# Patient Record
Sex: FEMALE | Race: BLACK OR AFRICAN AMERICAN | Employment: UNEMPLOYED | URBAN - METROPOLITAN AREA
[De-identification: names, ages, dates, MRNs, and addresses within clinical notes are randomized per-mention and may not be internally consistent; named-entity substitution may affect disease eponyms.]

---

## 2017-04-04 ENCOUNTER — OFFICE VISIT (OUTPATIENT)
Dept: GYNECOLOGY | Age: 52
End: 2017-04-04

## 2017-04-04 VITALS
WEIGHT: 206.4 LBS | BODY MASS INDEX: 33.17 KG/M2 | TEMPERATURE: 96.9 F | DIASTOLIC BLOOD PRESSURE: 70 MMHG | HEIGHT: 66 IN | RESPIRATION RATE: 17 BRPM | SYSTOLIC BLOOD PRESSURE: 127 MMHG | HEART RATE: 76 BPM

## 2017-04-04 DIAGNOSIS — D25.1 INTRAMURAL LEIOMYOMA OF UTERUS: ICD-10-CM

## 2017-04-04 DIAGNOSIS — R21 RASH, SKIN: ICD-10-CM

## 2017-04-04 DIAGNOSIS — R10.2 PELVIC PAIN IN FEMALE: ICD-10-CM

## 2017-04-04 DIAGNOSIS — Z01.419 WELL WOMAN EXAM WITH ROUTINE GYNECOLOGICAL EXAM: Primary | ICD-10-CM

## 2017-04-04 PROCEDURE — 99396 PREV VISIT EST AGE 40-64: CPT | Performed by: OBSTETRICS & GYNECOLOGY

## 2017-04-04 RX ORDER — CLOTRIMAZOLE AND BETAMETHASONE DIPROPIONATE 10; .64 MG/G; MG/G
CREAM TOPICAL 2 TIMES DAILY
Qty: 1 TUBE | Refills: 2 | Status: SHIPPED | OUTPATIENT
Start: 2017-04-04 | End: 2018-02-05 | Stop reason: SDUPTHER

## 2017-04-05 ASSESSMENT — ENCOUNTER SYMPTOMS
GASTROINTESTINAL NEGATIVE: 1
RESPIRATORY NEGATIVE: 1
EYES NEGATIVE: 1

## 2017-04-07 ENCOUNTER — HOSPITAL ENCOUNTER (OUTPATIENT)
Dept: WOMENS IMAGING | Age: 52
Discharge: HOME | End: 2017-04-07
Attending: OBSTETRICS & GYNECOLOGY | Admitting: OBSTETRICS & GYNECOLOGY

## 2017-04-07 DIAGNOSIS — Z12.31 VISIT FOR SCREENING MAMMOGRAM: ICD-10-CM

## 2017-04-07 LAB
HPV COMMENT: NORMAL
HPV TYPE 16: NOT DETECTED
HPV TYPE 18: NOT DETECTED
HPVOH (OTHER TYPES): NOT DETECTED

## 2017-04-10 ENCOUNTER — HOSPITAL ENCOUNTER (OUTPATIENT)
Dept: ULTRASOUND IMAGING | Age: 52
Discharge: HOME | End: 2017-04-10
Attending: OBSTETRICS & GYNECOLOGY | Admitting: OBSTETRICS & GYNECOLOGY

## 2017-04-10 DIAGNOSIS — R10.2 PELVIC AND PERINEAL PAIN: ICD-10-CM

## 2017-04-10 DIAGNOSIS — R10.2 PELVIC PAIN IN FEMALE: ICD-10-CM

## 2017-04-10 DIAGNOSIS — D25.1 INTRAMURAL LEIOMYOMA OF UTERUS: ICD-10-CM

## 2018-02-05 DIAGNOSIS — R21 RASH, SKIN: ICD-10-CM

## 2018-02-05 RX ORDER — CLOTRIMAZOLE AND BETAMETHASONE DIPROPIONATE 10; .64 MG/G; MG/G
CREAM TOPICAL
Qty: 30 G | Refills: 1 | Status: SHIPPED | OUTPATIENT
Start: 2018-02-05 | End: 2018-03-31

## 2018-04-09 ENCOUNTER — HOSPITAL ENCOUNTER (OUTPATIENT)
Dept: WOMENS IMAGING | Age: 53
Discharge: HOME | End: 2018-04-09
Attending: FAMILY MEDICINE | Admitting: FAMILY MEDICINE

## 2018-04-09 DIAGNOSIS — Z12.31 VISIT FOR SCREENING MAMMOGRAM: ICD-10-CM

## 2019-03-06 ENCOUNTER — OFFICE VISIT (OUTPATIENT)
Dept: GYNECOLOGY | Age: 54
End: 2019-03-06
Payer: MEDICAID

## 2019-03-06 VITALS
HEIGHT: 66 IN | SYSTOLIC BLOOD PRESSURE: 101 MMHG | BODY MASS INDEX: 31.7 KG/M2 | OXYGEN SATURATION: 99 % | DIASTOLIC BLOOD PRESSURE: 71 MMHG | RESPIRATION RATE: 17 BRPM | TEMPERATURE: 97.7 F | HEART RATE: 64 BPM | WEIGHT: 197.25 LBS

## 2019-03-06 DIAGNOSIS — Z01.419 WELL WOMAN EXAM WITH ROUTINE GYNECOLOGICAL EXAM: Primary | ICD-10-CM

## 2019-03-06 DIAGNOSIS — Z78.0 MENOPAUSE: ICD-10-CM

## 2019-03-06 DIAGNOSIS — Z12.11 ENCOUNTER FOR SCREENING COLONOSCOPY: ICD-10-CM

## 2019-03-06 PROCEDURE — 99396 PREV VISIT EST AGE 40-64: CPT | Performed by: OBSTETRICS & GYNECOLOGY

## 2019-03-06 PROCEDURE — G8484 FLU IMMUNIZE NO ADMIN: HCPCS | Performed by: OBSTETRICS & GYNECOLOGY

## 2019-03-06 ASSESSMENT — ENCOUNTER SYMPTOMS
RESPIRATORY NEGATIVE: 1
EYES NEGATIVE: 1
GASTROINTESTINAL NEGATIVE: 1

## 2019-03-14 RX ORDER — IBUPROFEN 200 MG
200 TABLET ORAL EVERY 6 HOURS PRN
COMMUNITY
End: 2019-12-22

## 2019-03-18 ENCOUNTER — ANESTHESIA EVENT (OUTPATIENT)
Dept: ENDOSCOPY | Age: 54
End: 2019-03-18
Payer: MEDICAID

## 2019-03-19 ENCOUNTER — ANESTHESIA (OUTPATIENT)
Dept: ENDOSCOPY | Age: 54
End: 2019-03-19
Payer: MEDICAID

## 2019-03-19 ENCOUNTER — HOSPITAL ENCOUNTER (OUTPATIENT)
Age: 54
Setting detail: OUTPATIENT SURGERY
Discharge: HOME | End: 2019-03-19
Attending: INTERNAL MEDICINE | Admitting: INTERNAL MEDICINE
Payer: MEDICAID

## 2019-03-19 VITALS
SYSTOLIC BLOOD PRESSURE: 101 MMHG | DIASTOLIC BLOOD PRESSURE: 62 MMHG | OXYGEN SATURATION: 100 % | RESPIRATION RATE: 18 BRPM | TEMPERATURE: 98.6 F

## 2019-03-19 VITALS
BODY MASS INDEX: 32.22 KG/M2 | RESPIRATION RATE: 16 BRPM | HEART RATE: 67 BPM | TEMPERATURE: 97 F | HEIGHT: 66 IN | WEIGHT: 200.51 LBS | SYSTOLIC BLOOD PRESSURE: 122 MMHG | DIASTOLIC BLOOD PRESSURE: 73 MMHG | OXYGEN SATURATION: 100 %

## 2019-03-19 DIAGNOSIS — Z12.11 COLON CANCER SCREENING: ICD-10-CM

## 2019-03-19 PROCEDURE — 2500000003 HC RX 250 WO HCPCS: Performed by: NURSE ANESTHETIST, CERTIFIED REGISTERED

## 2019-03-19 PROCEDURE — 3700000001 HC ADD 15 MINUTES (ANESTHESIA): Performed by: INTERNAL MEDICINE

## 2019-03-19 PROCEDURE — 7100000000 HC PACU RECOVERY - FIRST 15 MIN: Performed by: INTERNAL MEDICINE

## 2019-03-19 PROCEDURE — 2709999900 HC NON-CHARGEABLE SUPPLY: Performed by: INTERNAL MEDICINE

## 2019-03-19 PROCEDURE — 3609010600 HC COLONOSCOPY POLYPECTOMY SNARE/COLD BIOPSY: Performed by: INTERNAL MEDICINE

## 2019-03-19 PROCEDURE — 88305 TISSUE EXAM BY PATHOLOGIST: CPT

## 2019-03-19 PROCEDURE — 3609010300 HC COLONOSCOPY W/BIOPSY SINGLE/MULTIPLE: Performed by: INTERNAL MEDICINE

## 2019-03-19 PROCEDURE — 2580000003 HC RX 258: Performed by: ANESTHESIOLOGY

## 2019-03-19 PROCEDURE — 7100000011 HC PHASE II RECOVERY - ADDTL 15 MIN: Performed by: INTERNAL MEDICINE

## 2019-03-19 PROCEDURE — 7100000001 HC PACU RECOVERY - ADDTL 15 MIN: Performed by: INTERNAL MEDICINE

## 2019-03-19 PROCEDURE — 6360000002 HC RX W HCPCS: Performed by: NURSE ANESTHETIST, CERTIFIED REGISTERED

## 2019-03-19 PROCEDURE — 7100000010 HC PHASE II RECOVERY - FIRST 15 MIN: Performed by: INTERNAL MEDICINE

## 2019-03-19 PROCEDURE — 3700000000 HC ANESTHESIA ATTENDED CARE: Performed by: INTERNAL MEDICINE

## 2019-03-19 RX ORDER — PROPOFOL 10 MG/ML
INJECTION, EMULSION INTRAVENOUS PRN
Status: DISCONTINUED | OUTPATIENT
Start: 2019-03-19 | End: 2019-03-19 | Stop reason: SDUPTHER

## 2019-03-19 RX ORDER — PROPOFOL 10 MG/ML
INJECTION, EMULSION INTRAVENOUS CONTINUOUS PRN
Status: DISCONTINUED | OUTPATIENT
Start: 2019-03-19 | End: 2019-03-19 | Stop reason: SDUPTHER

## 2019-03-19 RX ORDER — SODIUM CHLORIDE 0.9 % (FLUSH) 0.9 %
10 SYRINGE (ML) INJECTION EVERY 12 HOURS SCHEDULED
Status: DISCONTINUED | OUTPATIENT
Start: 2019-03-19 | End: 2019-03-19 | Stop reason: HOSPADM

## 2019-03-19 RX ORDER — SODIUM CHLORIDE 9 MG/ML
INJECTION, SOLUTION INTRAVENOUS CONTINUOUS
Status: DISCONTINUED | OUTPATIENT
Start: 2019-03-19 | End: 2019-03-19 | Stop reason: HOSPADM

## 2019-03-19 RX ORDER — SODIUM CHLORIDE 0.9 % (FLUSH) 0.9 %
10 SYRINGE (ML) INJECTION PRN
Status: DISCONTINUED | OUTPATIENT
Start: 2019-03-19 | End: 2019-03-19 | Stop reason: HOSPADM

## 2019-03-19 RX ORDER — LIDOCAINE HYDROCHLORIDE 20 MG/ML
INJECTION, SOLUTION INFILTRATION; PERINEURAL PRN
Status: DISCONTINUED | OUTPATIENT
Start: 2019-03-19 | End: 2019-03-19 | Stop reason: SDUPTHER

## 2019-03-19 RX ADMIN — PROPOFOL 160 MCG/KG/MIN: 10 INJECTION, EMULSION INTRAVENOUS at 10:25

## 2019-03-19 RX ADMIN — SODIUM CHLORIDE: 9 INJECTION, SOLUTION INTRAVENOUS at 09:41

## 2019-03-19 RX ADMIN — LIDOCAINE HYDROCHLORIDE 100 MG: 20 INJECTION, SOLUTION INFILTRATION; PERINEURAL at 10:23

## 2019-03-19 RX ADMIN — PROPOFOL 100 MG: 10 INJECTION, EMULSION INTRAVENOUS at 10:25

## 2019-03-19 ASSESSMENT — PULMONARY FUNCTION TESTS
PIF_VALUE: 0

## 2019-03-19 ASSESSMENT — PAIN SCALES - GENERAL
PAINLEVEL_OUTOF10: 0

## 2019-03-19 ASSESSMENT — PAIN DESCRIPTION - DESCRIPTORS: DESCRIPTORS: ACHING

## 2019-03-19 ASSESSMENT — PAIN - FUNCTIONAL ASSESSMENT
PAIN_FUNCTIONAL_ASSESSMENT: ACTIVITIES ARE NOT PREVENTED
PAIN_FUNCTIONAL_ASSESSMENT: 0-10

## 2019-04-15 ENCOUNTER — INITIAL CONSULT (OUTPATIENT)
Dept: SURGERY | Age: 54
End: 2019-04-15
Payer: MEDICAID

## 2019-04-15 VITALS
WEIGHT: 200 LBS | BODY MASS INDEX: 33.32 KG/M2 | HEART RATE: 62 BPM | HEIGHT: 65 IN | SYSTOLIC BLOOD PRESSURE: 122 MMHG | DIASTOLIC BLOOD PRESSURE: 84 MMHG

## 2019-04-15 DIAGNOSIS — K62.1 RECTAL POLYP: Primary | ICD-10-CM

## 2019-04-15 PROCEDURE — 3017F COLORECTAL CA SCREEN DOC REV: CPT | Performed by: SURGERY

## 2019-04-15 PROCEDURE — G8427 DOCREV CUR MEDS BY ELIG CLIN: HCPCS | Performed by: SURGERY

## 2019-04-15 PROCEDURE — 99243 OFF/OP CNSLTJ NEW/EST LOW 30: CPT | Performed by: SURGERY

## 2019-04-15 PROCEDURE — G8417 CALC BMI ABV UP PARAM F/U: HCPCS | Performed by: SURGERY

## 2019-04-15 NOTE — PROGRESS NOTES
New Patient [facility]   Fariha Dobson MD    [address], [address]  [place] Dsouza  Phone: [phone]  Fax: [phone]    Ted Maharaj   YOB: 1965    Date of Visit:  4/15/2019    Blane Nickerson MD    HPI:     Rectal polyp:  Ted Maharaj is a 62 y.o. female seen at request of Dr. Myron Green. Patient presents for evaluation of a rectal mass found on recent colonoscopy. Her colonoscopy was for routine screening, although she states that she has had hemorrhoids with intermittent bleeding/black stool. She had bowel movements every day and does not take any stool softeners or laxatives on a regular basis. Allergies   Allergen Reactions    Aspirin Nausea Only     Outpatient Medications Marked as Taking for the 4/15/19 encounter (Initial consult) with Salima Arellano MD   Medication Sig Dispense Refill    ibuprofen (ADVIL;MOTRIN) 200 MG tablet Take 200 mg by mouth every 6 hours as needed for Pain      simvastatin (ZOCOR) 40 MG tablet Take 40 mg by mouth nightly.       triamterene-hydrochlorothiazide (MAXZIDE-25) 37.5-25 MG per tablet Take 1 tablet by mouth nightly          Past Medical History:   Diagnosis Date    Abnormal Pap smear     ASCUS (atypical squamous cells of undetermined significance) on gynecologic Papanicolaou smear complicating pregnancy, antepartum 5/2015    Endometrial thickening on ultra sound 10/2015    HIGH CHOLESTEROL     Hypertension     Migraines     Moderate dysplasia of cervix 8/2013     Past Surgical History:   Procedure Laterality Date    BREAST SURGERY  2003    left     COLONOSCOPY N/A 3/19/2019    COLONOSCOPY POLYPECTOMY SNARE/COLD BIOPSY performed by Gary Ni MD at [address] [facility] 3/19/2019    COLONOSCOPY WITH BIOPSY performed by Gary Ni MD at [address] [facility]  5/5/2013    ENDOMETRIAL BIOPSY      WISDOM TOOTH EXTRACTION       Family History   Problem Relation Age of Onset    Rheum Arthritis Neg Hx     Osteoarthritis Neg Hx     Asthma Neg Hx     Breast Cancer Neg Hx     Cancer Neg Hx     Diabetes Neg Hx     Heart Failure Neg Hx     High Cholesterol Neg Hx     Hypertension Neg Hx     Migraines Neg Hx     Ovarian Cancer Neg Hx     Rashes/Skin Problems Neg Hx     Seizures Neg Hx     Stroke Neg Hx     Thyroid Disease Neg Hx      Social History     Socioeconomic History    Marital status:      Spouse name: Not on file    Number of children: Not on file    Years of education: Not on file    Highest education level: Not on file   Occupational History    Not on file   Social Needs    Financial resource strain: Not on file    Food insecurity:     Worry: Not on file     Inability: Not on file    Transportation needs:     Medical: Not on file     Non-medical: Not on file   Tobacco Use    Smoking status: Never Smoker    Smokeless tobacco: Never Used   Substance and Sexual Activity    Alcohol use: No     Alcohol/week: 0.0 oz    Drug use: No    Sexual activity: Yes     Partners: Male   Lifestyle    Physical activity:     Days per week: Not on file     Minutes per session: Not on file    Stress: Not on file   Relationships    Social connections:     Talks on phone: Not on file     Gets together: Not on file     Attends Church service: Not on file     Active member of club or organization: Not on file     Attends meetings of clubs or organizations: Not on file     Relationship status: Not on file    Intimate partner violence:     Fear of current or ex partner: Not on file     Emotionally abused: Not on file     Physically abused: Not on file     Forced sexual activity: Not on file   Other Topics Concern    Not on file   Social History Narrative    Not on file          Vitals:    04/15/19 1007   BP: 122/84   Site: Left Upper Arm   Position: Sitting   Cuff Size: Large Adult   Pulse: 62   Weight: 200 lb (90.7 kg)   Height: 5' 5\" (1.651 m)     Body mass index is 33.28 kg/m². Wt Readings from Last 3 Encounters:   04/15/19 200 lb (90.7 kg)   03/19/19 200 lb 8.1 oz (90.9 kg)   03/06/19 197 lb 4 oz (89.5 kg)     BP Readings from Last 3 Encounters:   04/15/19 122/84   03/19/19 101/62   03/19/19 122/73          REVIEW OF SYSTEMS:   Pertinent positives are in HPI, otherwise all systems reviewed and negative    PHYSICAL EXAM:    CONSTITUTIONAL:  awake, alert, no apparent distress and mildly obese  ENT:  normocepalic, without obvious abnormality  NECK:  supple, symmetrical, trachea midline   LUNGS:  Resp easy and unlabored  CARDIOVASCULAR:  regular rate and rhythm  ABDOMEN:  Soft, NT, ND  MUSCULOSKELETAL: No edema  NEUROLOGIC:  Mental Status Exam:  Level of Alertness:   awake  Orientation:  person, place, time  Rectal Exam: multiple small external hemorrhoids, no rectal masses appreciated, no blood        DATA:  No results for input(s): WBC, HGB, HCT, PLT, NA, K, CL, CO2, BUN, CREATININE, MG, PHOS, CALCIUM, PTT, INR, AST, ALT, BILITOT, BILIDIR, NITRU, COLORU, BACTERIA in the last 72 hours.     Invalid input(s): PT, WBCU, RBCU, LEUKOCYTESUA  CBC with Differential:    Lab Results   Component Value Date    WBC 6.7 03/31/2018    RBC 4.62 03/31/2018    HGB 13.0 03/31/2018    HCT 39.4 03/31/2018     03/31/2018    MCV 85.2 03/31/2018    MCH 28.0 03/31/2018    MCHC 32.9 03/31/2018    RDW 13.9 03/31/2018    SEGSPCT 52.1 01/26/2012    LYMPHOPCT 32.0 03/31/2018    MONOPCT 9.0 03/31/2018    EOSPCT 0.9 01/26/2012    BASOPCT 1.2 03/31/2018    MONOSABS 0.6 03/31/2018    LYMPHSABS 2.2 03/31/2018    EOSABS 0.2 03/31/2018    BASOSABS 0.1 03/31/2018    DIFFTYPE Auto-K 01/26/2012     BMP:    Lab Results   Component Value Date     03/31/2018    K 3.8 03/31/2018     03/31/2018    CO2 28 03/31/2018    BUN 12 03/31/2018    LABALBU 4.0 03/31/2018    CREATININE 0.6 03/31/2018    CALCIUM 9.7 03/31/2018    GFRAA >60 03/31/2018    GFRAA >60 04/23/2012    LABGLOM >60 03/31/2018    GLUCOSE 111 03/31/2018       Pathology: FINAL DIAGNOSIS:       A. Rectum, biopsy:    -  Polypoid colorectal mucosa with intramucosal lymphoid follicles and  focal surface hyperplastic changes.    -  Negative for dysplasia or malignancy.       B. Rectum, biopsy:    -  Colorectal mucosa with features of mucosal prolapse associated with  surface erosion.    -  Negative for dysplasia or malignancy.  MUTGE/MUTGE    ASSESSMENT:     Diagnosis Orders   1. Rectal polyp           PLAN:    Yfn Powell presented for a rectal polyp near the dentate line seen on colonoscopy. Difficult to appreciate the polyp on exam.  Will proceed with rectal exam under anesthesia with rectal polypectomy. The risks, benefits, and alternatives were discussed with the patient and she is willing to consent for the procedure. I will update her H&P on the day of surgery.     Electronically signed by Daysi Boswell MD on 4/15/2019 at 11:08 AM

## 2019-04-15 NOTE — Clinical Note
Oscar Huff,I just saw Ms. fYn Powell in the office for her rectal polyp seen on colonoscopy. I'm going to perform a rectal exam under anesthesia with polypectomy later this week. Thank you for allowing me to take care of Ms. Nancy Barreto with you. Faviola Hurd

## 2019-04-15 NOTE — LETTER
[facility] Vascular Surgery  [address] [address]  Phone: [phone]  Fax: [phone]      April 15, 2019    David Jay  [address] [place] [address] [address] [address]      Dear Herbert Villagomez: The following arrangements have been completed for your procedure:  PLEASE FOLLOW THE BELOW DIRECTIONS CAREFULLY:                               Hospital: [facility]                                            [address]. [facility] [address]                             Date of Surgery:  04-                             Time of Surgery:  12:00 PM                                Arrival Time at Hospital:  10:30 AM    1. You are to have NOTHING TO EAT OR DRINK after midnight the night before your procedure. You may take any Heart or Blood Pressure medications with a sip of water to wash them down. Please refrain from any Alcohol or Caffeine or caffeine products for 24 hours prior to surgery. 2.  You will need someone to drive you home following your procedure, and to stay with you for the remainder of the day. 3.  If you develop a fever, please notify our office as soon as possible. Please keep in mind that emergencies requiring immediate intervention do occur, which creates delays. The staff will do everything possible to ensure your procedure remains on time. Please remember to bring a Photo ID & Insurance Card with you. Please  report in at the Surgery Desk down the right-hand hallway on the first floor. If the hospital requires any further information/testing, they will contact you. If you have any questions or concerns, please don't hesitate to call.     Sincerely,      Jaquan Perrin - Patient Services Specialist for:  Geovany Arndt MD

## 2019-04-15 NOTE — LETTER
CONSENT TO OPERATION  AND/OR OTHER PROCEDURE(S)          PATIENT : Miguelina Madrigal   YOB: 1965    DATE : 4/15/19          1. I request and consent that Dr. Tr Salmeron,  and/or his associates or assistants perform an operation and/or procedures on the above patient at  [facility] [place], to treat the condition(s) which appear indicated by the diagnostic studies already performed. I have been told that in general terms the nature, purpose and reasonable expectations of the operation and/or procedure(s) are:     Colorectal Exam under anesthesia with rectal polypectomy       2. It has been explained to me by the informing physician that during the course of the operation and/or procedure(s) unforeseen conditions may be revealed that necessitate an extension of the original operation and/or procedure(s) or different operation and/or procedures than those set forth in Paragraph 1. I therefore authorize and request that my physician and/or his associates or assistants perform such operations and/or procedures as are necessary and desirable in the exercise of professional judgment. The authority granted under this Paragraph 2 shall extend to all conditions that require treatment and are known to my physician at the time the operation is commenced. 3. I have been made aware by the informing physician of certain risks and consequences that are associated with the operation and/or procedure(s) described in Paragraph 1, the reasonable alternative methods or treatment, the possible consequences, the possibility that the operation and/or procedure(s) may be unsuccessful and the possibility of complications. I understand the reasonably known risks to be:  ? Bleeding  ? Infection  ? Poor Healing  ? Possible Damage to Nerve, Vessel, Tendon/Muscle or Bone  ? Need for further Treatment/Surgery  ? Stiffness  ? Pain  ? Residual or Recurrent Symptoms  ? Anesthetic and/or Medical Risks  ? 4. I have also been informed by the informing physician that there are other risks from both known and unknown causes that are attendant to the performance of any surgical procedure. I am aware that the practice of medicine and surgery is not an exact science, and that no guarantees have been made to me concerning the results of the operation and/or procedure(s). 5. I   CONSENT / REFUSE CONSENT  (strike the phrase that does not apply) to the taking of photographs before, during and/or after the operation or procedure for scientific/educational purposes. 6. I consent to the administration of anesthesia and to the use of such anesthetics as may be deemed advisable by the anesthesiologist who has been engaged by me or my physician. 7. I certify that I have read and understand the above consent to operation and/or other procedure(s); that the explanations therein referred to were made to me by the informing physician in advance of my signing this consent; that all blanks or statements requiring insertion or completion were filled in and inapplicable paragraphs, if any, were stricken before I signed; and that all questions asked by me about the operation and/or procedure(s) which I have consented to have been fully answered in a satisfactory manner.             _______________________  Witness        Signature Of Patient          _______________________  Informing Physician         Signature of Informing Physician           If patient is unable to sign or is a minor, complete one of the following:    (A)  Patient is a minor  years of age.     (B)  Patient is unable to sign because:

## 2019-04-15 NOTE — LETTER
Surgery Scheduling Form:      DEMOGRAPHICS:                                                                                                         .    Patient Name:  Giselle Aguayo  Patient :  1965   Patient SS#:      Patient Phone:  [phone] (home) [phone] (work) Alt. Patient Phone:                     Patient Address:  [address] [id number]    PCP:  Pham Nash MD  Insurance:  Payor: [employer] / Plan: [employer]  / Product Type: *No Product type* /   Insurance ID Number:    Payor/Plan Subscr  Sex Relation Sub. Ins. ID Effective Group Num   1. [id number] Female  [id number] 1/1/15 [place]                                   [address]     DIAGNOSIS & PROCEDURE:                                                                                       .    Diagnosis:   K62.1 -  Rectal Polyp  Operation:  Colorectal Exam Under Anesthesia with Rectal Polypectomy  Location:  [facility]  Surgeon:  ELY Mariscal Or:                                                                                    .    Surgeon's Scheduling Instruction:  Elective  Requested Date: 2019   OR Time: 12:00      Patient Arrival Time: 10:30OR Time Required:  60  Minutes  Anesthesia:  GENERAL  Equipment:                                                            SA Required:  Yes    Status:  Outpatient          Standard C-Arm:  No  PAT Required: Yes                             Best Time to Call: Any  Pt. Requested to see PCP for Pre-op H & P:  No  Special Comments:     INTERPRETOR RAIZA SLOAN?                        Governor Doe MD    88   PRE-CERTIFICATION INFORMATION:                                                                           .    Procedure:       CPT Code Modifier          [id number]

## 2019-04-16 ENCOUNTER — HOSPITAL ENCOUNTER (OUTPATIENT)
Dept: WOMENS IMAGING | Age: 54
Discharge: HOME | End: 2019-04-16
Payer: MEDICAID

## 2019-04-16 DIAGNOSIS — Z01.419 WELL WOMAN EXAM WITH ROUTINE GYNECOLOGICAL EXAM: ICD-10-CM

## 2019-04-16 PROCEDURE — 77067 SCR MAMMO BI INCL CAD: CPT

## 2019-04-16 NOTE — PROGRESS NOTES
C-Difficile admission screening and protocol:     * Admitted with diarrhea?no     *Prior history of C-Diff. In last 3 months?no     *Antibiotic use in the past 6-8 weeks?no     *Prior hospitalization or nursing home in the last month?  no        [address] time__1030 per daughter__________        Surgery time___1200_________    Take the following medications with a sip of water:n/a    Do not eat or drink anything after 12:00 midnight prior to your surgery. This includes water chewing gum, mints and ice chips. You may brush your teeth and gargle the morning of your surgery, but do not swallow the water     Please see your family doctor/pediatrician for a history and physical and/or concerning medications. Bring any test results/reports from your physicians office. If you are under the care of a heart doctor or specialist doctor, please be aware that you may be asked to them for clearance    You may be asked to stop blood thinners such as Coumadin, Plavix, Fragmin, Lovenox, etc., or any anti-inflammatories such as:  Aspirin, Ibuprofen, Advil, Naproxen prior to your surgery. We also ask that you stop any OTC medications such as fish oil, vitamin E, glucosamine, garlic, Multivitamins, COQ 10, etc.    We ask that you do not smoke 24 hours prior to surgery  We ask that you do not  drink any alcoholic beverages 24 hours prior to surgery     You must make arrangements for a responsible adult to take you home after your surgery. For your safety you will not be allowed to leave alone or drive yourself home. Your surgery will be cancelled if you do not have a ride home. Also for your safety, it is strongly suggested that someone stay with you the first 24 hours after your surgery. A parent or legal guardian must accompany a child scheduled for surgery and plan to stay at the hospital until the child is discharged.     Please do not bring other children with [phone]  Please note these are generalized instructions for all surgical cases, you may be provided with more specific instructions according to your surgery.

## 2019-04-17 ENCOUNTER — ANESTHESIA EVENT (OUTPATIENT)
Dept: OPERATING ROOM | Age: 54
End: 2019-04-17
Payer: MEDICAID

## 2019-04-18 ENCOUNTER — HOSPITAL ENCOUNTER (OUTPATIENT)
Age: 54
Setting detail: OUTPATIENT SURGERY
Discharge: HOME | End: 2019-04-18
Attending: SURGERY | Admitting: SURGERY
Payer: MEDICAID

## 2019-04-18 ENCOUNTER — ANESTHESIA (OUTPATIENT)
Dept: OPERATING ROOM | Age: 54
End: 2019-04-18
Payer: MEDICAID

## 2019-04-18 VITALS
HEIGHT: 65 IN | OXYGEN SATURATION: 95 % | BODY MASS INDEX: 32.87 KG/M2 | WEIGHT: 197.31 LBS | SYSTOLIC BLOOD PRESSURE: 157 MMHG | RESPIRATION RATE: 18 BRPM | DIASTOLIC BLOOD PRESSURE: 94 MMHG | HEART RATE: 63 BPM | TEMPERATURE: 96.9 F

## 2019-04-18 VITALS
RESPIRATION RATE: 1 BRPM | DIASTOLIC BLOOD PRESSURE: 70 MMHG | TEMPERATURE: 96.8 F | OXYGEN SATURATION: 100 % | SYSTOLIC BLOOD PRESSURE: 126 MMHG

## 2019-04-18 DIAGNOSIS — K64.8 INTERNAL AND EXTERNAL HEMORRHOIDS WITHOUT COMPLICATION: Primary | ICD-10-CM

## 2019-04-18 DIAGNOSIS — K62.1 RECTAL POLYP: ICD-10-CM

## 2019-04-18 DIAGNOSIS — K64.4 INTERNAL AND EXTERNAL HEMORRHOIDS WITHOUT COMPLICATION: Primary | ICD-10-CM

## 2019-04-18 PROCEDURE — 2500000003 HC RX 250 WO HCPCS: Performed by: SURGERY

## 2019-04-18 PROCEDURE — 2580000003 HC RX 258: Performed by: ANESTHESIOLOGY

## 2019-04-18 PROCEDURE — 2580000003 HC RX 258: Performed by: SURGERY

## 2019-04-18 PROCEDURE — C9290 INJ, BUPIVACAINE LIPOSOME: HCPCS | Performed by: SURGERY

## 2019-04-18 PROCEDURE — 2500000003 HC RX 250 WO HCPCS: Performed by: NURSE ANESTHETIST, CERTIFIED REGISTERED

## 2019-04-18 PROCEDURE — 7100000000 HC PACU RECOVERY - FIRST 15 MIN: Performed by: SURGERY

## 2019-04-18 PROCEDURE — 7100000001 HC PACU RECOVERY - ADDTL 15 MIN: Performed by: SURGERY

## 2019-04-18 PROCEDURE — 7100000011 HC PHASE II RECOVERY - ADDTL 15 MIN: Performed by: SURGERY

## 2019-04-18 PROCEDURE — 6360000002 HC RX W HCPCS: Performed by: SURGERY

## 2019-04-18 PROCEDURE — 6360000002 HC RX W HCPCS: Performed by: ANESTHESIOLOGY

## 2019-04-18 PROCEDURE — 6370000000 HC RX 637 (ALT 250 FOR IP): Performed by: ANESTHESIOLOGY

## 2019-04-18 PROCEDURE — 2720000010 HC SURG SUPPLY STERILE: Performed by: SURGERY

## 2019-04-18 PROCEDURE — 46255 REMOVE INT/EXT HEM 1 GROUP: CPT | Performed by: SURGERY

## 2019-04-18 PROCEDURE — 6360000002 HC RX W HCPCS: Performed by: NURSE ANESTHETIST, CERTIFIED REGISTERED

## 2019-04-18 PROCEDURE — 3600000013 HC SURGERY LEVEL 3 ADDTL 15MIN: Performed by: SURGERY

## 2019-04-18 PROCEDURE — 2709999900 HC NON-CHARGEABLE SUPPLY: Performed by: SURGERY

## 2019-04-18 PROCEDURE — 3700000000 HC ANESTHESIA ATTENDED CARE: Performed by: SURGERY

## 2019-04-18 PROCEDURE — 6370000000 HC RX 637 (ALT 250 FOR IP): Performed by: SURGERY

## 2019-04-18 PROCEDURE — 88304 TISSUE EXAM BY PATHOLOGIST: CPT

## 2019-04-18 PROCEDURE — 7100000010 HC PHASE II RECOVERY - FIRST 15 MIN: Performed by: SURGERY

## 2019-04-18 PROCEDURE — 3700000001 HC ADD 15 MINUTES (ANESTHESIA): Performed by: SURGERY

## 2019-04-18 PROCEDURE — 3600000003 HC SURGERY LEVEL 3 BASE: Performed by: SURGERY

## 2019-04-18 RX ORDER — FENTANYL CITRATE 50 UG/ML
25 INJECTION, SOLUTION INTRAMUSCULAR; INTRAVENOUS EVERY 5 MIN PRN
Status: DISCONTINUED | OUTPATIENT
Start: 2019-04-18 | End: 2019-04-18 | Stop reason: HOSPADM

## 2019-04-18 RX ORDER — MEPERIDINE HYDROCHLORIDE 25 MG/ML
12.5 INJECTION INTRAMUSCULAR; INTRAVENOUS; SUBCUTANEOUS EVERY 5 MIN PRN
Status: DISCONTINUED | OUTPATIENT
Start: 2019-04-18 | End: 2019-04-18 | Stop reason: HOSPADM

## 2019-04-18 RX ORDER — MORPHINE SULFATE 2 MG/ML
1 INJECTION, SOLUTION INTRAMUSCULAR; INTRAVENOUS EVERY 5 MIN PRN
Status: DISCONTINUED | OUTPATIENT
Start: 2019-04-18 | End: 2019-04-18 | Stop reason: HOSPADM

## 2019-04-18 RX ORDER — NEOSTIGMINE METHYLSULFATE 1 MG/ML
INJECTION, SOLUTION INTRAVENOUS PRN
Status: DISCONTINUED | OUTPATIENT
Start: 2019-04-18 | End: 2019-04-18 | Stop reason: SDUPTHER

## 2019-04-18 RX ORDER — GLYCOPYRROLATE 0.2 MG/ML
INJECTION INTRAMUSCULAR; INTRAVENOUS PRN
Status: DISCONTINUED | OUTPATIENT
Start: 2019-04-18 | End: 2019-04-18 | Stop reason: SDUPTHER

## 2019-04-18 RX ORDER — ONDANSETRON 2 MG/ML
4 INJECTION INTRAMUSCULAR; INTRAVENOUS
Status: DISCONTINUED | OUTPATIENT
Start: 2019-04-18 | End: 2019-04-18 | Stop reason: HOSPADM

## 2019-04-18 RX ORDER — KETOROLAC TROMETHAMINE 30 MG/ML
INJECTION, SOLUTION INTRAMUSCULAR; INTRAVENOUS PRN
Status: DISCONTINUED | OUTPATIENT
Start: 2019-04-18 | End: 2019-04-18 | Stop reason: SDUPTHER

## 2019-04-18 RX ORDER — SODIUM CHLORIDE 0.9 % (FLUSH) 0.9 %
10 SYRINGE (ML) INJECTION PRN
Status: DISCONTINUED | OUTPATIENT
Start: 2019-04-18 | End: 2019-04-18 | Stop reason: HOSPADM

## 2019-04-18 RX ORDER — ROCURONIUM BROMIDE 10 MG/ML
INJECTION, SOLUTION INTRAVENOUS PRN
Status: DISCONTINUED | OUTPATIENT
Start: 2019-04-18 | End: 2019-04-18 | Stop reason: SDUPTHER

## 2019-04-18 RX ORDER — OXYCODONE HYDROCHLORIDE 5 MG/1
10 TABLET ORAL PRN
Status: COMPLETED | OUTPATIENT
Start: 2019-04-18 | End: 2019-04-18

## 2019-04-18 RX ORDER — DEXAMETHASONE SODIUM PHOSPHATE 4 MG/ML
INJECTION, SOLUTION INTRA-ARTICULAR; INTRALESIONAL; INTRAMUSCULAR; INTRAVENOUS; SOFT TISSUE PRN
Status: DISCONTINUED | OUTPATIENT
Start: 2019-04-18 | End: 2019-04-18 | Stop reason: SDUPTHER

## 2019-04-18 RX ORDER — MAGNESIUM HYDROXIDE 1200 MG/15ML
LIQUID ORAL CONTINUOUS PRN
Status: COMPLETED | OUTPATIENT
Start: 2019-04-18 | End: 2019-04-18

## 2019-04-18 RX ORDER — OXYCODONE HYDROCHLORIDE AND ACETAMINOPHEN 5; 325 MG/1; MG/1
1-2 TABLET ORAL EVERY 6 HOURS PRN
Qty: 28 TABLET | Refills: 0 | Status: SHIPPED | OUTPATIENT
Start: 2019-04-18 | End: 2019-05-03 | Stop reason: SDUPTHER

## 2019-04-18 RX ORDER — ONDANSETRON 2 MG/ML
INJECTION INTRAMUSCULAR; INTRAVENOUS PRN
Status: DISCONTINUED | OUTPATIENT
Start: 2019-04-18 | End: 2019-04-18 | Stop reason: SDUPTHER

## 2019-04-18 RX ORDER — FENTANYL CITRATE 50 UG/ML
50 INJECTION, SOLUTION INTRAMUSCULAR; INTRAVENOUS EVERY 5 MIN PRN
Status: DISCONTINUED | OUTPATIENT
Start: 2019-04-18 | End: 2019-04-18 | Stop reason: HOSPADM

## 2019-04-18 RX ORDER — LIDOCAINE 50 MG/G
OINTMENT TOPICAL
Qty: 1 TUBE | Refills: 1 | Status: SHIPPED | OUTPATIENT
Start: 2019-04-18 | End: 2020-07-22

## 2019-04-18 RX ORDER — MORPHINE SULFATE 2 MG/ML
2 INJECTION, SOLUTION INTRAMUSCULAR; INTRAVENOUS EVERY 5 MIN PRN
Status: DISCONTINUED | OUTPATIENT
Start: 2019-04-18 | End: 2019-04-18 | Stop reason: HOSPADM

## 2019-04-18 RX ORDER — MIDAZOLAM HYDROCHLORIDE 1 MG/ML
INJECTION INTRAMUSCULAR; INTRAVENOUS PRN
Status: DISCONTINUED | OUTPATIENT
Start: 2019-04-18 | End: 2019-04-18 | Stop reason: SDUPTHER

## 2019-04-18 RX ORDER — SODIUM CHLORIDE 9 MG/ML
INJECTION, SOLUTION INTRAVENOUS CONTINUOUS
Status: DISCONTINUED | OUTPATIENT
Start: 2019-04-18 | End: 2019-04-18 | Stop reason: HOSPADM

## 2019-04-18 RX ORDER — PROPOFOL 10 MG/ML
INJECTION, EMULSION INTRAVENOUS PRN
Status: DISCONTINUED | OUTPATIENT
Start: 2019-04-18 | End: 2019-04-18 | Stop reason: SDUPTHER

## 2019-04-18 RX ORDER — LIDOCAINE HYDROCHLORIDE 10 MG/ML
INJECTION, SOLUTION EPIDURAL; INFILTRATION; INTRACAUDAL; PERINEURAL PRN
Status: DISCONTINUED | OUTPATIENT
Start: 2019-04-18 | End: 2019-04-18 | Stop reason: SDUPTHER

## 2019-04-18 RX ORDER — OXYCODONE HYDROCHLORIDE 5 MG/1
5 TABLET ORAL PRN
Status: COMPLETED | OUTPATIENT
Start: 2019-04-18 | End: 2019-04-18

## 2019-04-18 RX ORDER — SODIUM CHLORIDE 0.9 % (FLUSH) 0.9 %
10 SYRINGE (ML) INJECTION EVERY 12 HOURS SCHEDULED
Status: DISCONTINUED | OUTPATIENT
Start: 2019-04-18 | End: 2019-04-18 | Stop reason: HOSPADM

## 2019-04-18 RX ORDER — FENTANYL CITRATE 50 UG/ML
INJECTION, SOLUTION INTRAMUSCULAR; INTRAVENOUS PRN
Status: DISCONTINUED | OUTPATIENT
Start: 2019-04-18 | End: 2019-04-18 | Stop reason: SDUPTHER

## 2019-04-18 RX ADMIN — NEOSTIGMINE 4 MG: 1 INJECTION INTRAVENOUS at 13:37

## 2019-04-18 RX ADMIN — ROCURONIUM BROMIDE 40 MG: 10 INJECTION INTRAVENOUS at 13:00

## 2019-04-18 RX ADMIN — FENTANYL CITRATE 100 MCG: 50 INJECTION INTRAMUSCULAR; INTRAVENOUS at 13:05

## 2019-04-18 RX ADMIN — METRONIDAZOLE 500 MG: 500 INJECTION, SOLUTION INTRAVENOUS at 11:30

## 2019-04-18 RX ADMIN — LIDOCAINE HYDROCHLORIDE 50 MG: 10 INJECTION, SOLUTION EPIDURAL; INFILTRATION; INTRACAUDAL; PERINEURAL at 12:59

## 2019-04-18 RX ADMIN — DEXAMETHASONE SODIUM PHOSPHATE 4 MG: 4 INJECTION, SOLUTION INTRAMUSCULAR; INTRAVENOUS at 13:24

## 2019-04-18 RX ADMIN — PROPOFOL 150 MG: 10 INJECTION, EMULSION INTRAVENOUS at 12:59

## 2019-04-18 RX ADMIN — MIDAZOLAM 2 MG: 1 INJECTION INTRAMUSCULAR; INTRAVENOUS at 13:00

## 2019-04-18 RX ADMIN — SODIUM CHLORIDE: 9 INJECTION, SOLUTION INTRAVENOUS at 12:53

## 2019-04-18 RX ADMIN — Medication 2 G: at 12:57

## 2019-04-18 RX ADMIN — SODIUM CHLORIDE: 9 INJECTION, SOLUTION INTRAVENOUS at 11:25

## 2019-04-18 RX ADMIN — FENTANYL CITRATE 100 MCG: 50 INJECTION INTRAMUSCULAR; INTRAVENOUS at 12:59

## 2019-04-18 RX ADMIN — GLYCOPYRROLATE 0.6 MG: 0.2 INJECTION, SOLUTION INTRAMUSCULAR; INTRAVENOUS at 13:37

## 2019-04-18 RX ADMIN — KETOROLAC TROMETHAMINE 30 MG: 30 INJECTION, SOLUTION INTRAMUSCULAR at 13:37

## 2019-04-18 RX ADMIN — OXYCODONE HYDROCHLORIDE 5 MG: 5 TABLET ORAL at 16:04

## 2019-04-18 RX ADMIN — ONDANSETRON 4 MG: 2 INJECTION INTRAMUSCULAR; INTRAVENOUS at 13:24

## 2019-04-18 RX ADMIN — FENTANYL CITRATE 25 MCG: 50 INJECTION, SOLUTION INTRAMUSCULAR; INTRAVENOUS at 14:26

## 2019-04-18 RX ADMIN — FENTANYL CITRATE 50 MCG: 50 INJECTION, SOLUTION INTRAMUSCULAR; INTRAVENOUS at 14:19

## 2019-04-18 ASSESSMENT — PULMONARY FUNCTION TESTS
PIF_VALUE: 0
PIF_VALUE: 0
PIF_VALUE: 1
PIF_VALUE: 19
PIF_VALUE: 23
PIF_VALUE: 0
PIF_VALUE: 1
PIF_VALUE: 24
PIF_VALUE: 24
PIF_VALUE: 0
PIF_VALUE: 3
PIF_VALUE: 1
PIF_VALUE: 25
PIF_VALUE: 22
PIF_VALUE: 1
PIF_VALUE: 1
PIF_VALUE: 26
PIF_VALUE: 23
PIF_VALUE: 23
PIF_VALUE: 24
PIF_VALUE: 21
PIF_VALUE: 25
PIF_VALUE: 24
PIF_VALUE: 0
PIF_VALUE: 19
PIF_VALUE: 24
PIF_VALUE: 22
PIF_VALUE: 0
PIF_VALUE: 24
PIF_VALUE: 0
PIF_VALUE: 24
PIF_VALUE: 21
PIF_VALUE: 10
PIF_VALUE: 36
PIF_VALUE: 24
PIF_VALUE: 0
PIF_VALUE: 0
PIF_VALUE: 22
PIF_VALUE: 0
PIF_VALUE: 33
PIF_VALUE: 0
PIF_VALUE: 24
PIF_VALUE: 24
PIF_VALUE: 19
PIF_VALUE: 23
PIF_VALUE: 20
PIF_VALUE: 1
PIF_VALUE: 23
PIF_VALUE: 0
PIF_VALUE: 23
PIF_VALUE: 19
PIF_VALUE: 23
PIF_VALUE: 25
PIF_VALUE: 6
PIF_VALUE: 4
PIF_VALUE: 1
PIF_VALUE: 24
PIF_VALUE: 19
PIF_VALUE: 1
PIF_VALUE: 0
PIF_VALUE: 24
PIF_VALUE: 0
PIF_VALUE: 6
PIF_VALUE: 23
PIF_VALUE: 0
PIF_VALUE: 22
PIF_VALUE: 0
PIF_VALUE: 23
PIF_VALUE: 9
PIF_VALUE: 5
PIF_VALUE: 0
PIF_VALUE: 24
PIF_VALUE: 21
PIF_VALUE: 0
PIF_VALUE: 2
PIF_VALUE: 23
PIF_VALUE: 24

## 2019-04-18 ASSESSMENT — PAIN - FUNCTIONAL ASSESSMENT
PAIN_FUNCTIONAL_ASSESSMENT: 0-10
PAIN_FUNCTIONAL_ASSESSMENT: ACTIVITIES ARE NOT PREVENTED
PAIN_FUNCTIONAL_ASSESSMENT: ACTIVITIES ARE NOT PREVENTED

## 2019-04-18 ASSESSMENT — PAIN DESCRIPTION - LOCATION
LOCATION: RECTUM

## 2019-04-18 ASSESSMENT — PAIN DESCRIPTION - FREQUENCY
FREQUENCY: CONTINUOUS

## 2019-04-18 ASSESSMENT — PAIN DESCRIPTION - PAIN TYPE
TYPE: SURGICAL PAIN

## 2019-04-18 ASSESSMENT — PAIN DESCRIPTION - PROGRESSION
CLINICAL_PROGRESSION: NOT CHANGED
CLINICAL_PROGRESSION: GRADUALLY WORSENING
CLINICAL_PROGRESSION: GRADUALLY WORSENING
CLINICAL_PROGRESSION: GRADUALLY IMPROVING
CLINICAL_PROGRESSION: RAPIDLY IMPROVING

## 2019-04-18 ASSESSMENT — PAIN SCALES - GENERAL
PAINLEVEL_OUTOF10: 5
PAINLEVEL_OUTOF10: 7
PAINLEVEL_OUTOF10: 3
PAINLEVEL_OUTOF10: 10
PAINLEVEL_OUTOF10: 5
PAINLEVEL_OUTOF10: 5
PAINLEVEL_OUTOF10: 0

## 2019-04-18 ASSESSMENT — PAIN DESCRIPTION - ONSET
ONSET: AWAKENED FROM SLEEP
ONSET: ON-GOING

## 2019-04-18 ASSESSMENT — PAIN DESCRIPTION - DESCRIPTORS
DESCRIPTORS: SHARP
DESCRIPTORS: ACHING;DISCOMFORT
DESCRIPTORS: SHARP
DESCRIPTORS: ACHING;DISCOMFORT
DESCRIPTORS: PATIENT UNABLE TO DESCRIBE

## 2019-04-18 NOTE — OP NOTE
Operative Report      Patient: Francis Bagley MRN: [id number]     YOB: 1965  Age: 62 y.o. Sex: female        Primary Care Physician: Juan Burkett MD         DATE OF OPERATION: 04/18/19    PREOPERATIVE DIAGNOSIS: rectal polyp    POSTOPERATIVE DIAGNOSIS: internal and external hemorrhoids    PROCEDURE PERFORMED: rectal exam under anesthesia, internal and external hemorrhoidectomy x 1 column    SURGEON: Kristin Daniel MD    ANESTHESIA: General endotracheal    ASA CLASS: 3    DVT PROPHYLAXIS: bilateral SCDs    ANTIBIOTICS: ancef 2 g and flagyl 500 mg IV    INDICATIONS: Francis Bagley presented for follow up after colonoscopy that showed a distal rectal mass. The risks, benefits, and alternatives were explained to the patient and she was willing to consent for the procedure. Procedure Details:  After informed consent was obtained, the patient  was brought to the operating room and placed in the supine position. General  anesthesia was then induced. She was then flipped over into the prone  jackknife position and prepped and draped in the usual sterile fashion. A  timeout was then performed. We then performed a rectal exam under anesthesia,  which showed very redundant rectal tissue anteriorly extending from internal to her external  Hemorrhoids. The rectum was circumferentially evaluated both visually and manually and no other polyps/masses were appreciated. We  Then performed an internal and external hemorrhoidectomy x 1 column. A Elza clamp was used to grasp the internal  and external hemorrhoids, and a handheld LigaSure was used to come through the  mucosa and hemorrhoidal tissue removing it in its entirety. This was sent to  the pathologist for further evaluation. A 2-0 Vicryl suture was then placed  internally, and then a locking suture was then run distally through the anal  canal to the external skin where the external hemorrhoid was removed. This  reapproximated the edges well.  Care was taken during this process to also get  some of the underlying tissue and muscle to close the space. Once tied down, this was  hemostatic. We inspected the surrounding area again and no other pathology was seen. A rectal exam was performed and the anal canal was widely patent. Exparel was then injected in four  quadrants around the anus. Gel foam was placed in the anal canal, which will be removed in the recovery area. The patient tolerated the procedure well and was  taken to the PACU in stable condition. The exam after performing a  hemorrhoidectomy showed a widely patent anal canal with no evidence of  narrowing.       FINDINGS: Very redundant internal and external hemorrhoidal tissue anteriorly    Estimated Blood Loss: Minimal    Complications: none           Specimen: hemorrhoidal tissue                  Electronically signed by Holden Pyle MD on 4/18/2019 at 1:40 PM

## 2019-04-18 NOTE — PROGRESS NOTES
Received from PACU. Admitted to Phase 2 care. Awake and alert, respirations easy and even. Oriented to room and surroundings.  at bedside. Patient denies pain at this time.

## 2019-04-18 NOTE — ANESTHESIA POSTPROCEDURE EVALUATION
Department of Anesthesiology  Postprocedure Note    Patient: Francis Bagley  MRN: [id number]  YOB: 1965  Date of evaluation: 4/18/2019  Time:  2:51 PM     Procedure Summary     Date:  04/18/19 Room / Location:  [facility] OR 01 / [facility] OR    Anesthesia Start:  1253 Anesthesia Stop:  1401    Procedure:  COLORECTAL EXAM UNDER ANESTHESIA WITH RECTAL POLYPECTOMY (N/A Anus) Diagnosis:       Rectal polyp      (RECTAL POLYP)    Surgeon:  Kristin Daniel MD Responsible Provider:  Doug Sheridan MD    Anesthesia Type:  general ASA Status:  3          Anesthesia Type: general    Shaina Phase I: Shaina Score: 9    Shaina Phase II:      Last vitals: Reviewed and per EMR flowsheets.        Anesthesia Post Evaluation    Patient location during evaluation: PACU  Patient participation: complete - patient participated  Level of consciousness: awake and alert  Pain score: 0  Airway patency: patent  Nausea & Vomiting: no nausea and no vomiting  Complications: no  Cardiovascular status: blood pressure returned to baseline  Respiratory status: acceptable  Hydration status: euvolemic

## 2019-04-18 NOTE — H&P
H&P Update    Patient's History and Physical/office note from April 15, 2019 was reviewed. Patient examined. There has been no change. Patient with distal rectal polyp. Will proceed to the OR for rectal exam under anesthesia, excision of rectal polyp. Ancef/flagyl ordered. Bilateral SCDs.     Sixto Starks MD

## 2019-04-18 NOTE — PROGRESS NOTES
Tolerating sitting up on side of bed. States pain remains unchanged, but she would like to go home. Stable for discharge.

## 2019-04-18 NOTE — ANESTHESIA PRE PROCEDURE
Department of Anesthesiology  Preprocedure Note       Name:  Taryn Glasgow   Age:  62 y.o.  :  1965                                          MRN:  [id number]         Date:  2019      Surgeon: Sydni De Guzman):  Katt Mercer MD    Procedure: Veneda Dust UNDER ANESTHESIA WITH RECTAL POLYPECTOMY (N/A Anus)    Medications prior to admission:   Prior to Admission medications    Medication Sig Start Date End Date Taking? Authorizing Provider   ibuprofen (ADVIL;MOTRIN) 200 MG tablet Take 200 mg by mouth every 6 hours as needed for Pain   Yes Historical Provider, MD   omeprazole (PRILOSEC) 40 MG delayed release capsule Take 1 capsule by mouth daily for 30 doses  Patient taking differently: Take 40 mg by mouth Daily with lunch Takes daily at 1400 3/31/18 4/16/19 Yes Leslie Loyd MD   simvastatin (ZOCOR) 40 MG tablet Take 40 mg by mouth nightly. 13  Yes Historical Provider, MD   triamterene-hydrochlorothiazide (MAXZIDE-25) 37.5-25 MG per tablet Take 1 tablet by mouth Daily with lunch Takes daily at 1400 3/23/13  Yes Historical Provider, MD       Current medications:    Current Facility-Administered Medications   Medication Dose Route Frequency Provider Last Rate Last Dose    ceFAZolin (ANCEF) 2 g in dextrose 5 % 100 mL IVPB  2 g Intravenous On Call to [address] MD        metronidazole (FLAGYL) 500 mg in NaCl 100 mL IVPB premix  500 mg Intravenous On Call to [address]  mL/hr at 19 1130 500 mg at 19 1130    0.9 % sodium chloride infusion   Intravenous Continuous David Villegas  mL/hr at 19 1125      sodium chloride flush 0.9 % injection 10 mL  10 mL Intravenous 2 times per day David Villegas MD        sodium chloride flush 0.9 % injection 10 mL  10 mL Intravenous PRN David Villegas MD           Allergies:     Allergies   Allergen Reactions    Aspirin Nausea Only       Problem List:    Patient Active Problem List   Diagnosis Code    ASCUS (atypical squamous cells of undetermined significance) on Pap smear R89.6    Cervical high risk HPV (human papillomavirus) test positive R87.810    Mild dysplasia of cervix N87.0    Moderate dysplasia of cervix N87.1    Capsulitis, adhesive shoulder M75.00       Past Medical History:        Diagnosis Date    Abnormal Pap smear     ASCUS (atypical squamous cells of undetermined significance) on gynecologic Papanicolaou smear complicating pregnancy, antepartum 5/2015    Endometrial thickening on ultra sound 10/2015    GERD (gastroesophageal reflux disease)     HIGH CHOLESTEROL     Hypertension     Migraines     Moderate dysplasia of cervix 8/2013       Past Surgical History:        Procedure Laterality Date    BREAST SURGERY  2003    left     COLONOSCOPY N/A 3/19/2019    COLONOSCOPY POLYPECTOMY SNARE/COLD BIOPSY performed by Konrad Hale MD at [address] N/A 3/19/2019    COLONOSCOPY WITH BIOPSY performed by Konrad Hale MD at [address]  5/5/2013    ENDOMETRIAL BIOPSY      WISDOM TOOTH EXTRACTION         Social History:    Social History     Tobacco Use    Smoking status: Never Smoker    Smokeless tobacco: Never Used   Substance Use Topics    Alcohol use: No     Alcohol/week: 0.0 oz                                Counseling given: Not Answered      Vital Signs (Current):   Vitals:    04/16/19 1013 04/18/19 1109   BP:  (!) 124/59   Pulse:  63   Resp:  20   Temp:  97.8 °F (36.6 °C)   TempSrc:  Temporal   SpO2:  99%   Weight: 200 lb (90.7 kg) 197 lb 5 oz (89.5 kg)   Height: 5' 5\" (1.651 m) 5' 5\" (1.651 m)                                              BP Readings from Last 3 Encounters:   04/18/19 (!) 124/59   04/15/19 122/84   03/19/19 101/62       NPO Status: Time of last liquid consumption: 2300                       Time of last solid consumption: 2300                        Date of last liquid consumption: 04/17/19                        Date of last solid food consumption: 04/17/19    BMI:   Wt Readings from Last 3 Encounters:   04/18/19 197 lb 5 oz (89.5 kg)   04/15/19 200 lb (90.7 kg)   03/19/19 200 lb 8.1 oz (90.9 kg)     Body mass index is 32.83 kg/m². CBC:   Lab Results   Component Value Date    WBC 6.7 03/31/2018    RBC 4.62 03/31/2018    HGB 13.0 03/31/2018    HCT 39.4 03/31/2018    MCV 85.2 03/31/2018    RDW 13.9 03/31/2018     03/31/2018       CMP:   Lab Results   Component Value Date     03/31/2018    K 3.8 03/31/2018     03/31/2018    CO2 28 03/31/2018    BUN 12 03/31/2018    CREATININE 0.6 03/31/2018    GFRAA >60 03/31/2018    GFRAA >60 04/23/2012    AGRATIO 1.1 03/31/2018    LABGLOM >60 03/31/2018    GLUCOSE 111 03/31/2018    PROT 7.6 03/31/2018    PROT 7.9 04/23/2012    CALCIUM 9.7 03/31/2018    BILITOT <0.2 03/31/2018    ALKPHOS 101 03/31/2018    AST 15 03/31/2018    ALT 8 03/31/2018       POC Tests: No results for input(s): POCGLU, POCNA, POCK, POCCL, POCBUN, POCHEMO, POCHCT in the last 72 hours.     Coags: No results found for: PROTIME, INR, APTT    HCG (If Applicable):   Lab Results   Component Value Date    PREGTESTUR Negative 10/11/2015        ABGs: No results found for: PHART, PO2ART, ABB0UOJ, EQO2DLD, BEART, D0WZDKPE     Type & Screen (If Applicable):  No results found for: LABABO, [address]    Anesthesia Evaluation  Patient summary reviewed no history of anesthetic complications:   Airway: Mallampati: II  TM distance: >3 FB     Mouth opening: > = 3 FB Dental:          Pulmonary: breath sounds clear to auscultation      (-) COPD and asthma                           Cardiovascular:  Exercise tolerance: good (>4 METS),   (+) hypertension:,     (-) past MI, CAD, CABG/stent and dysrhythmias      Rhythm: regular                      Neuro/Psych:   (+) headaches: migraine headaches,             GI/Hepatic/Renal:   (+) GERD:,      (-) hepatitis, liver disease and no renal disease       Endo/Other:        (-) diabetes mellitus, hypothyroidism               Abdominal:           Vascular:     - DVT and PE. Anesthesia Plan      general     ASA 3     (H and P complete with assistance of an )  Induction: intravenous. Anesthetic plan and risks discussed with patient, spouse and child/children. Plan discussed with CRNA. DOS STAFF ADDENDUM:    Pt seen and examined, chart reviewed (including anesthesia, drug and allergy history). No interval changes to history and physical examination. Anesthetic plan, risks, benefits, alternatives, and personnel involved discussed with patient. Patient verbalized an understanding and agrees to proceed.       Jarvis Jose MD  April 18, 2019  11:42 AM            Jarvis Jose MD   4/18/2019

## 2019-05-03 ENCOUNTER — OFFICE VISIT (OUTPATIENT)
Dept: SURGERY | Age: 54
End: 2019-05-03

## 2019-05-03 VITALS — BODY MASS INDEX: 32.78 KG/M2 | WEIGHT: 197 LBS

## 2019-05-03 DIAGNOSIS — K64.4 INTERNAL AND EXTERNAL HEMORRHOIDS WITHOUT COMPLICATION: ICD-10-CM

## 2019-05-03 DIAGNOSIS — K64.8 INTERNAL AND EXTERNAL HEMORRHOIDS WITHOUT COMPLICATION: ICD-10-CM

## 2019-05-03 PROCEDURE — 99024 POSTOP FOLLOW-UP VISIT: CPT | Performed by: SURGERY

## 2019-05-03 RX ORDER — OXYCODONE HYDROCHLORIDE AND ACETAMINOPHEN 5; 325 MG/1; MG/1
1-2 TABLET ORAL EVERY 6 HOURS PRN
Qty: 28 TABLET | Refills: 0 | Status: SHIPPED | OUTPATIENT
Start: 2019-05-03 | End: 2019-05-10

## 2019-05-03 RX ORDER — DOCUSATE SODIUM 100 MG/1
100 CAPSULE, LIQUID FILLED ORAL 2 TIMES DAILY PRN
Qty: 60 CAPSULE | Refills: 1 | Status: SHIPPED | OUTPATIENT
Start: 2019-05-03 | End: 2019-07-15 | Stop reason: SDUPTHER

## 2019-05-03 NOTE — Clinical Note
Oscar Huff,I just saw Ms. Miguelina Madrigal back in the office after her rectal exam under anesthesia with internal and external hemorrhoidectomy. She is doing well. I am going to have her follow up in 3-4 weeks prn. Thank you for allowing me to take care of Ms. Veda Keller with you. Cezar Mensah

## 2019-05-03 NOTE — PROGRESS NOTES
Post Operative Visit    [facility] - [place]  [place] [facility]   Ariela Lopez MD    [address]  [place]   Phone: [phone]  Fax: [phone]    Meron Harvey   YOB: 1965    Date of Visit:  5/3/2019    No ref. provider found  Polina Loza MD    Subjective:     Meron Harvey presents for a two week follow-up s/p rectal exam under anesthesia, internal and external hemorrhoidectomy. Overall she has been doing well since her operation. She has been eating/drinking without difficulty. She has not had any nausea and vomiting. Her BMs are daily. There has been some relief of her pain. It is  controlled with her current pain regimen. She denies any fevers or chills. Pain Score:   5    Allergies   Allergen Reactions    Aspirin Nausea Only     Outpatient Medications Marked as Taking for the 5/3/19 encounter (Office Visit) with Pepe Wilson MD   Medication Sig Dispense Refill    oxyCODONE-acetaminophen (PERCOCET) 5-325 MG per tablet Take 1-2 tablets by mouth every 6 hours as needed for Pain for up to 7 days. Intended supply: 7 days. Take lowest dose possible to manage pain 28 tablet 0    docusate sodium (COLACE) 100 MG capsule Take 1 capsule by mouth 2 times daily as needed for Constipation (take while on narcotic pain medication) 60 capsule 1    psyllium (METAMUCIL SMOOTH TEXTURE) 28 % packet Take 1 packet by mouth daily Take with full glass of h20 or juice 30 packet 4    lidocaine (XYLOCAINE) 5 % ointment Apply topically as needed. 1 Tube 1    ibuprofen (ADVIL;MOTRIN) 200 MG tablet Take 200 mg by mouth every 6 hours as needed for Pain      simvastatin (ZOCOR) 40 MG tablet Take 40 mg by mouth nightly.       triamterene-hydrochlorothiazide (MAXZIDE-25) 37.5-25 MG per tablet Take 1 tablet by mouth Daily with lunch Takes daily at 1400         Vitals:    05/03/19 0957   Weight: 197 lb (89.4 kg)     Body mass index is 32.78 kg/m². Wt Readings from Last 3 Encounters:   05/03/19 197 lb (89.4 kg)   04/18/19 197 lb 5 oz (89.5 kg)   04/15/19 200 lb (90.7 kg)     BP Readings from Last 3 Encounters:   04/18/19 126/70   04/18/19 (!) 157/94   04/15/19 122/84          Objective:    CONSTITUTIONAL:  awake, alert, no apparent distress    LUNGS:  Resp easy and unlabored  MUSCULOSKELETAL: No edema  NEUROLOGIC:  Mental Status Exam:  Level of Alertness:   awake  Orientation:   person, place, time  SKIN: incision healing appropriately, no surrounding erythema, no active drainage      Pathology:  FINAL DIAGNOSIS:    Hemorrhoid:  - Anorectal mucosa with dilated vessels consistent with hemorrhoids.   COURTNEY/COURTNEY    ASSESSMENT:     Diagnosis Orders   1. Internal and external hemorrhoids without complication  oxyCODONE-acetaminophen (PERCOCET) 5-325 MG per tablet    docusate sodium (COLACE) 100 MG capsule       PLAN:    Ania Zimmerman is doing well s/p rectal EUA, int and external hemorrhoidectomy. Her incision is healing appropriately. She is still having pretty significant pain with BMs. Will give additional percocet script, colace, and lidocaine 5% topical.  Will plan on having her follow up prn.      Electronically signed by Neymar Donahue MD on 5/3/2019 at 10:15 AM

## 2019-12-22 ENCOUNTER — HOSPITAL ENCOUNTER (EMERGENCY)
Age: 54
Discharge: HOME | End: 2019-12-22
Payer: MEDICAID

## 2019-12-22 VITALS
RESPIRATION RATE: 14 BRPM | TEMPERATURE: 97.9 F | OXYGEN SATURATION: 100 % | DIASTOLIC BLOOD PRESSURE: 77 MMHG | BODY MASS INDEX: 33.24 KG/M2 | WEIGHT: 199.52 LBS | SYSTOLIC BLOOD PRESSURE: 128 MMHG | HEART RATE: 53 BPM | HEIGHT: 65 IN

## 2019-12-22 DIAGNOSIS — M62.838 TRAPEZIUS MUSCLE SPASM: Primary | ICD-10-CM

## 2019-12-22 DIAGNOSIS — G44.209 TENSION HEADACHE: ICD-10-CM

## 2019-12-22 DIAGNOSIS — M25.512 ACUTE PAIN OF LEFT SHOULDER: ICD-10-CM

## 2019-12-22 PROCEDURE — 96372 THER/PROPH/DIAG INJ SC/IM: CPT

## 2019-12-22 PROCEDURE — 6360000002 HC RX W HCPCS: Performed by: PHYSICIAN ASSISTANT

## 2019-12-22 PROCEDURE — 99283 EMERGENCY DEPT VISIT LOW MDM: CPT

## 2019-12-22 RX ORDER — KETOROLAC TROMETHAMINE 30 MG/ML
30 INJECTION, SOLUTION INTRAMUSCULAR; INTRAVENOUS ONCE
Status: COMPLETED | OUTPATIENT
Start: 2019-12-22 | End: 2019-12-22

## 2019-12-22 RX ORDER — KETOROLAC TROMETHAMINE 10 MG/1
10 TABLET, FILM COATED ORAL EVERY 6 HOURS PRN
Qty: 15 TABLET | Refills: 0 | Status: SHIPPED | OUTPATIENT
Start: 2019-12-22 | End: 2020-07-22

## 2019-12-22 RX ORDER — ORPHENADRINE CITRATE 30 MG/ML
60 INJECTION INTRAMUSCULAR; INTRAVENOUS ONCE
Status: COMPLETED | OUTPATIENT
Start: 2019-12-22 | End: 2019-12-22

## 2019-12-22 RX ORDER — CYCLOBENZAPRINE HCL 10 MG
10 TABLET ORAL 3 TIMES DAILY PRN
Qty: 15 TABLET | Refills: 0 | Status: SHIPPED | OUTPATIENT
Start: 2019-12-22 | End: 2020-07-22

## 2019-12-22 RX ADMIN — ORPHENADRINE CITRATE 60 MG: 30 INJECTION INTRAMUSCULAR; INTRAVENOUS at 10:22

## 2019-12-22 RX ADMIN — KETOROLAC TROMETHAMINE 30 MG: 30 INJECTION, SOLUTION INTRAMUSCULAR at 10:22

## 2019-12-22 ASSESSMENT — PAIN DESCRIPTION - PAIN TYPE
TYPE: ACUTE PAIN

## 2019-12-22 ASSESSMENT — PAIN SCALES - GENERAL
PAINLEVEL_OUTOF10: 6

## 2019-12-22 ASSESSMENT — PAIN DESCRIPTION - PROGRESSION
CLINICAL_PROGRESSION: NOT CHANGED
CLINICAL_PROGRESSION: GRADUALLY IMPROVING

## 2019-12-22 ASSESSMENT — PAIN - FUNCTIONAL ASSESSMENT
PAIN_FUNCTIONAL_ASSESSMENT: 0-10
PAIN_FUNCTIONAL_ASSESSMENT: PREVENTS OR INTERFERES SOME ACTIVE ACTIVITIES AND ADLS
PAIN_FUNCTIONAL_ASSESSMENT: ACTIVITIES ARE NOT PREVENTED
PAIN_FUNCTIONAL_ASSESSMENT: ACTIVITIES ARE NOT PREVENTED

## 2019-12-22 ASSESSMENT — PAIN DESCRIPTION - FREQUENCY
FREQUENCY: CONTINUOUS

## 2019-12-22 ASSESSMENT — ENCOUNTER SYMPTOMS
VOMITING: 0
PHOTOPHOBIA: 0
NAUSEA: 0
SHORTNESS OF BREATH: 0
BACK PAIN: 0

## 2019-12-22 ASSESSMENT — PAIN DESCRIPTION - ORIENTATION
ORIENTATION: LEFT

## 2019-12-22 ASSESSMENT — PAIN DESCRIPTION - DESCRIPTORS
DESCRIPTORS: CONSTANT

## 2019-12-22 ASSESSMENT — PAIN DESCRIPTION - LOCATION
LOCATION: HEAD;NECK

## 2019-12-22 ASSESSMENT — PAIN DESCRIPTION - ONSET
ONSET: ON-GOING
ONSET: ON-GOING

## 2020-07-22 ENCOUNTER — APPOINTMENT (OUTPATIENT)
Dept: GENERAL RADIOLOGY | Age: 55
End: 2020-07-22
Payer: MEDICAID

## 2020-07-22 ENCOUNTER — APPOINTMENT (OUTPATIENT)
Dept: CT IMAGING | Age: 55
End: 2020-07-22
Payer: MEDICAID

## 2020-07-22 ENCOUNTER — HOSPITAL ENCOUNTER (EMERGENCY)
Age: 55
Discharge: HOME | End: 2020-07-22
Payer: MEDICAID

## 2020-07-22 VITALS
RESPIRATION RATE: 18 BRPM | HEIGHT: 67 IN | OXYGEN SATURATION: 97 % | BODY MASS INDEX: 31.38 KG/M2 | HEART RATE: 89 BPM | WEIGHT: 199.96 LBS | TEMPERATURE: 97.9 F | DIASTOLIC BLOOD PRESSURE: 88 MMHG | SYSTOLIC BLOOD PRESSURE: 138 MMHG

## 2020-07-22 LAB
ANION GAP SERPL CALCULATED.3IONS-SCNC: 14 MMOL/L (ref 3–16)
BASOPHILS ABSOLUTE: 0.1 K/UL (ref 0–0.2)
BASOPHILS RELATIVE PERCENT: 0.8 %
BUN BLDV-MCNC: 10 MG/DL (ref 7–20)
CALCIUM SERPL-MCNC: 10.1 MG/DL (ref 8.3–10.6)
CHLORIDE BLD-SCNC: 101 MMOL/L (ref 99–110)
CO2: 23 MMOL/L (ref 21–32)
CREAT SERPL-MCNC: 0.8 MG/DL (ref 0.6–1.1)
EOSINOPHILS ABSOLUTE: 0.5 K/UL (ref 0–0.6)
EOSINOPHILS RELATIVE PERCENT: 7.8 %
GFR AFRICAN AMERICAN: >60
GFR NON-AFRICAN AMERICAN: >60
GLUCOSE BLD-MCNC: 121 MG/DL (ref 70–99)
HCT VFR BLD CALC: 40.5 % (ref 36–48)
HEMOGLOBIN: 13.4 G/DL (ref 12–16)
LYMPHOCYTES ABSOLUTE: 2.6 K/UL (ref 1–5.1)
LYMPHOCYTES RELATIVE PERCENT: 36.4 %
MAGNESIUM: 2.3 MG/DL (ref 1.8–2.4)
MCH RBC QN AUTO: 28.6 PG (ref 26–34)
MCHC RBC AUTO-ENTMCNC: 33.1 G/DL (ref 31–36)
MCV RBC AUTO: 86.5 FL (ref 80–100)
MONOCYTES ABSOLUTE: 0.6 K/UL (ref 0–1.3)
MONOCYTES RELATIVE PERCENT: 8.2 %
NEUTROPHILS ABSOLUTE: 3.3 K/UL (ref 1.7–7.7)
NEUTROPHILS RELATIVE PERCENT: 46.8 %
PDW BLD-RTO: 14.1 % (ref 12.4–15.4)
PLATELET # BLD: 255 K/UL (ref 135–450)
PMV BLD AUTO: 7.7 FL (ref 5–10.5)
POTASSIUM REFLEX MAGNESIUM: 3.5 MMOL/L (ref 3.5–5.1)
RBC # BLD: 4.68 M/UL (ref 4–5.2)
SODIUM BLD-SCNC: 138 MMOL/L (ref 136–145)
WBC # BLD: 7 K/UL (ref 4–11)

## 2020-07-22 PROCEDURE — 80048 BASIC METABOLIC PNL TOTAL CA: CPT

## 2020-07-22 PROCEDURE — 70450 CT HEAD/BRAIN W/O DYE: CPT

## 2020-07-22 PROCEDURE — 99284 EMERGENCY DEPT VISIT MOD MDM: CPT

## 2020-07-22 PROCEDURE — 71046 X-RAY EXAM CHEST 2 VIEWS: CPT

## 2020-07-22 PROCEDURE — U0003 INFECTIOUS AGENT DETECTION BY NUCLEIC ACID (DNA OR RNA); SEVERE ACUTE RESPIRATORY SYNDROME CORONAVIRUS 2 (SARS-COV-2) (CORONAVIRUS DISEASE [COVID-19]), AMPLIFIED PROBE TECHNIQUE, MAKING USE OF HIGH THROUGHPUT TECHNOLOGIES AS DESCRIBED BY CMS-2020-01-R: HCPCS

## 2020-07-22 PROCEDURE — 6370000000 HC RX 637 (ALT 250 FOR IP): Performed by: NURSE PRACTITIONER

## 2020-07-22 PROCEDURE — 83735 ASSAY OF MAGNESIUM: CPT

## 2020-07-22 PROCEDURE — U0002 COVID-19 LAB TEST NON-CDC: HCPCS

## 2020-07-22 PROCEDURE — 85025 COMPLETE CBC W/AUTO DIFF WBC: CPT

## 2020-07-22 RX ORDER — BUTALBITAL, ACETAMINOPHEN AND CAFFEINE 50; 325; 40 MG/1; MG/1; MG/1
1 TABLET ORAL ONCE
Status: COMPLETED | OUTPATIENT
Start: 2020-07-22 | End: 2020-07-22

## 2020-07-22 RX ORDER — BUTALBITAL, ACETAMINOPHEN AND CAFFEINE 50; 325; 40 MG/1; MG/1; MG/1
1 TABLET ORAL EVERY 6 HOURS PRN
Qty: 20 TABLET | Refills: 0 | Status: SHIPPED | OUTPATIENT
Start: 2020-07-22

## 2020-07-22 RX ORDER — PREDNISONE 20 MG/1
TABLET ORAL
Qty: 18 TABLET | Refills: 0 | Status: SHIPPED | OUTPATIENT
Start: 2020-07-22 | End: 2020-08-01

## 2020-07-22 RX ORDER — GUAIFENESIN/DEXTROMETHORPHAN 100-10MG/5
5 SYRUP ORAL ONCE
Status: COMPLETED | OUTPATIENT
Start: 2020-07-22 | End: 2020-07-22

## 2020-07-22 RX ORDER — GUAIFENESIN/DEXTROMETHORPHAN 100-10MG/5
5 SYRUP ORAL 3 TIMES DAILY PRN
Qty: 120 ML | Refills: 0 | Status: SHIPPED | OUTPATIENT
Start: 2020-07-22 | End: 2020-08-01

## 2020-07-22 RX ADMIN — BUTALBITAL, ACETAMINOPHEN, AND CAFFEINE 1 TABLET: 50; 325; 40 TABLET ORAL at 17:39

## 2020-07-22 RX ADMIN — GUAIFENESIN AND DEXTROMETHORPHAN 5 ML: 100; 10 SYRUP ORAL at 17:39

## 2020-07-22 ASSESSMENT — VISUAL ACUITY: OU: 1

## 2020-07-22 ASSESSMENT — ENCOUNTER SYMPTOMS
RESPIRATORY NEGATIVE: 1
GASTROINTESTINAL NEGATIVE: 1

## 2020-07-22 ASSESSMENT — PAIN SCALES - GENERAL: PAINLEVEL_OUTOF10: 5

## 2020-07-22 NOTE — ED PROVIDER NOTES
[facility] EMERGENCY DEPARTMENT  [address] [address]  Dept: [phone]  Loc: [phone]  eMERGENCYdEPARTMENT eNCOUnter      Pt Name: Marquis Bermudez  MRN: [id number]  Mercedes 1965  Date of evaluation: 7/22/2020  Provider:ALEXANDRIA Jesus CNP     Evaluated by the advanced practice provider    CHIEF COMPLAINT       Chief Complaint   Patient presents with    Cough     pt with cough onset 3 weesks ago. predominately nonproductive cough. denies fever. assiciated HA at times       CRITICAL CARE TIME         HISTORY OF PRESENT ILLNESS  (Location/Symptom, Timing/Onset, Context/Setting, Quality, Duration,Modifying Factors, Severity.)   Fama Ferol Hashimoto is a 60 y.o. female who presents to the emergency department complaining of episodic bifrontal headache with blurred vision. Reports that she has had these chronic headaches for years. Usually takes ibuprofen and symptoms go away. Today she did not take anything. Second complaint for 3 weeks that she is had some cough and chest congestion without any known fever. Cough is nonproductive. Did not seek medical attention with primary care. Waited to come to the emergency department. Denies any known travel or illness exposures. Nursing Notes were reviewedand agreed with or any disagreements were addressed in the HPI. REVIEW OF SYSTEMS    (2-9 systems for level 4, 10 or more for level 5)     Review of Systems   HENT: Negative. Respiratory: Negative. Cardiovascular: Negative. Gastrointestinal: Negative. Genitourinary: Negative. Musculoskeletal: Negative. Neurological: Positive for headaches. All other systems reviewed and are negative. Except as noted above the remainder of the review of systems was reviewed and negative.        PAST MEDICAL HISTORY         Diagnosis Date    Abnormal Pap smear     ASCUS (atypical squamous cells of undetermined significance) on gynecologic Papanicolaou smear complicating pregnancy, antepartum 5/2015    Endometrial thickening on ultra sound 10/2015    GERD (gastroesophageal reflux disease)     HIGH CHOLESTEROL     Hypertension     Migraines     Moderate dysplasia of cervix 8/2013       SURGICAL HISTORY           Procedure Laterality Date    BREAST SURGERY  2003    left     COLONOSCOPY N/A 3/19/2019    COLONOSCOPY POLYPECTOMY SNARE/COLD BIOPSY performed by Kurt Haas MD at [address] COLONOSCOPY N/A 3/19/2019    COLONOSCOPY WITH BIOPSY performed by Kurt Haas MD at [address]  5/5/2013    ENDOMETRIAL BIOPSY      HEMORROIDECTOMY N/A 4/18/2019    COLORECTAL EXAM UNDER ANESTHESIA WITH RECTAL POLYPECTOMY performed by Aga Shoemaker MD at [place]     [unfilled]    ALLERGIES     Aspirin    FAMILY HISTORY           Problem Relation Age of Onset    Rheum Arthritis Neg Hx     Osteoarthritis Neg Hx     Asthma Neg Hx     Breast Cancer Neg Hx     Cancer Neg Hx     Diabetes Neg Hx     Heart Failure Neg Hx     High Cholesterol Neg Hx     Hypertension Neg Hx     Migraines Neg Hx     Ovarian Cancer Neg Hx     Rashes/Skin Problems Neg Hx     Seizures Neg Hx     Stroke Neg Hx     Thyroid Disease Neg Hx      No family status information on file. SOCIAL HISTORY      reports that she has never smoked. She has never used smokeless tobacco. She reports that she does not drink alcohol or use drugs.     PHYSICAL EXAM    (up to 7 for level 4, 8 or more for level 5)     ED Triage Vitals   Enc Vitals Group      BP 07/22/20 1515 138/88      Pulse 07/22/20 1454 94      Resp 07/22/20 1454 17      Temp 07/22/20 1454 97.9 °F (36.6 °C)      Temp Source 07/22/20 1454 Oral      SpO2 07/22/20 1454 97 %      Weight 07/22/20 1454 199 lb 15.3 oz (90.7 kg)      Height 07/22/20 1454 5' 7\" (1.702 m)      Head Circumference --       Peak Flow -- images such as CT, Ultrasound and MRI are read by the radiologist. Plain radiographic images are visualized and preliminarilyinterpreted by the emergency physician with the below findings:    Interpretation per the Radiologist below,if available at the time of this note:    [address]   Final Result   No acute intracranial abnormality. XR CHEST (2 VW)   Final Result   Stable chest with no acute cardiopulmonary process. LABS:  Labs Reviewed   BASIC METABOLIC PANEL W/ REFLEX TO MG FOR LOW K - Abnormal; Notable for the following components:       Result Value    Glucose 121 (*)     All other components within normal limits    Narrative:     Performed at:  [facility]  [address] [place], [place] [address]   Phone [phone]   CBC WITH AUTO DIFFERENTIAL    Narrative:     Performed at:  [facility]  [address] [facility] [address]   Phone [phone]   MAGNESIUM    Narrative:     Performed at:  [facility]  [address] [facility] [address]   Phone [phone]       All other labs were within normal range or not returned as of this dictation. EMERGENCY DEPARTMENT COURSE and DIFFERENTIAL DIAGNOSIS/MDM:   Vitals:    Vitals:    07/22/20 1454 07/22/20 1515   BP:  138/88   Pulse: 94    Resp: 17    Temp: 97.9 °F (36.6 °C)    TempSrc: Oral    SpO2: 97%    Weight: 199 lb 15.3 oz (90.7 kg)    Height: 5' 7\" (1.702 m)      Medications   guaiFENesin-dextromethorphan (ROBITUSSIN DM) 100-10 MG/5ML syrup 5 mL (5 mLs Oral Given 7/22/20 1739)   butalbital-acetaminophen-caffeine (FIORICET, ESGIC) per tablet 1 tablet (1 tablet Oral Given 7/22/20 1739)       MDM   Patient was seen and evaluated per myself. Dr. Manuel Valderrama present available for consultation as needed. Patient is afebrile hemodynamically stable. No evidence of respiratory distress.   No evidence of neurological derangement or encephalopathy. Lung fields are clear. COVID testing obtained for purposes of surveillance. Chest x-ray and laboratory studies were obtained per nursing protocol all of which were negative. CT of the head was obtained to evaluate for episodic bifrontal headaches. CT head negative. No evidence of sinusitis, mass or stroke. No indication that warrants antibiotics. Patient will receive symptomatic treatment for her cough and congestion. And she will receive a prescription for Fioricet for episodic bifrontal headache. Otherwise she can follow-up on an outpatient basis with Dr. Nidhi Jules. This dictation was performed with a verbal recognition program (DRAGON) and it was checked for errors. It is possible that there are still dictated errors within this office note. If so, please bring any errors to my attention for an addendum. All efforts were made to ensure that this office note is accurate. CONSULTS:  None    PROCEDURES:  Procedures    FINAL IMPRESSION      1. Nonintractable episodic headache, unspecified headache type    2.  Bronchitis          DISPOSITION/PLAN   @[email or web address]@    PATIENT REFERRED TO:  Main Lucas MD  [address] [place] [address]  [phone]    Schedule an appointment as soon as possible for a visit         DISCHARGE MEDICATIONS:  New Prescriptions    BUTALBITAL-ACETAMINOPHEN-CAFFEINE (FIORICET, ESGIC) -40 MG PER TABLET    Take 1 tablet by mouth every 6 hours as needed for Headaches or Migraine    GUAIFENESIN-DEXTROMETHORPHAN (ROBITUSSIN DM) 100-10 MG/5ML SYRUP    Take 5 mLs by mouth 3 times daily as needed for Cough    PREDNISONE (DELTASONE) 20 MG TABLET    3 tabs po qam for 3 days then 2 tabs qam for 3 days the 1 tab qam for 3 days       (Please note that portions of this note were completed with a voice recognition program.  Efforts were made to edit the dictations but occasionally words are mis-transcribed.)    Alyx Frias Swati, [place] - [place] - [place]  07/22/20 1825

## 2020-07-23 ENCOUNTER — CARE COORDINATION (OUTPATIENT)
Dept: CARE COORDINATION | Age: 55
End: 2020-07-23

## 2020-07-23 LAB — SARS-COV-2, PCR: DETECTED

## 2020-07-23 NOTE — CARE COORDINATION
Outreach call made to f/u on ED visit from 7/22/20 and no answer. Left message with ACM contact information. ACM will outreach at another time.

## 2021-04-13 ENCOUNTER — HOSPITAL ENCOUNTER (EMERGENCY)
Age: 56
Discharge: HOME | End: 2021-04-13
Attending: EMERGENCY MEDICINE
Payer: MEDICAID

## 2021-04-13 ENCOUNTER — APPOINTMENT (OUTPATIENT)
Dept: GENERAL RADIOLOGY | Age: 56
End: 2021-04-13
Payer: MEDICAID

## 2021-04-13 VITALS
TEMPERATURE: 99 F | OXYGEN SATURATION: 99 % | HEART RATE: 75 BPM | RESPIRATION RATE: 17 BRPM | SYSTOLIC BLOOD PRESSURE: 106 MMHG | DIASTOLIC BLOOD PRESSURE: 69 MMHG

## 2021-04-13 DIAGNOSIS — R05.9 COUGH: Primary | ICD-10-CM

## 2021-04-13 LAB
A/G RATIO: 1.1 (ref 1.1–2.2)
ALBUMIN SERPL-MCNC: 4.2 G/DL (ref 3.4–5)
ALP BLD-CCNC: 99 U/L (ref 40–129)
ALT SERPL-CCNC: 18 U/L (ref 10–40)
ANION GAP SERPL CALCULATED.3IONS-SCNC: 12 MMOL/L (ref 3–16)
AST SERPL-CCNC: 24 U/L (ref 15–37)
BACTERIA: ABNORMAL /HPF
BASOPHILS ABSOLUTE: 0.1 K/UL (ref 0–0.2)
BASOPHILS RELATIVE PERCENT: 0.7 %
BILIRUB SERPL-MCNC: <0.2 MG/DL (ref 0–1)
BILIRUBIN URINE: NEGATIVE
BLOOD, URINE: ABNORMAL
BUN BLDV-MCNC: 10 MG/DL (ref 7–20)
CALCIUM SERPL-MCNC: 9.8 MG/DL (ref 8.3–10.6)
CHLORIDE BLD-SCNC: 100 MMOL/L (ref 99–110)
CLARITY: CLEAR
CO2: 25 MMOL/L (ref 21–32)
COLOR: YELLOW
CREAT SERPL-MCNC: 0.7 MG/DL (ref 0.6–1.1)
EKG ATRIAL RATE: 80 BPM
EKG DIAGNOSIS: NORMAL
EKG P AXIS: 63 DEGREES
EKG P-R INTERVAL: 138 MS
EKG Q-T INTERVAL: 368 MS
EKG QRS DURATION: 82 MS
EKG QTC CALCULATION (BAZETT): 424 MS
EKG R AXIS: 37 DEGREES
EKG T AXIS: 29 DEGREES
EKG VENTRICULAR RATE: 80 BPM
EOSINOPHILS ABSOLUTE: 0.2 K/UL (ref 0–0.6)
EOSINOPHILS RELATIVE PERCENT: 2.5 %
EPITHELIAL CELLS, UA: 22 /HPF (ref 0–5)
GFR AFRICAN AMERICAN: >60
GFR NON-AFRICAN AMERICAN: >60
GLOBULIN: 3.8 G/DL
GLUCOSE BLD-MCNC: 116 MG/DL (ref 70–99)
GLUCOSE URINE: NEGATIVE MG/DL
HCT VFR BLD CALC: 40.3 % (ref 36–48)
HEMOGLOBIN: 13.4 G/DL (ref 12–16)
HYALINE CASTS: 2 /LPF (ref 0–8)
KETONES, URINE: NEGATIVE MG/DL
LEUKOCYTE ESTERASE, URINE: ABNORMAL
LYMPHOCYTES ABSOLUTE: 2.5 K/UL (ref 1–5.1)
LYMPHOCYTES RELATIVE PERCENT: 27 %
MCH RBC QN AUTO: 28.1 PG (ref 26–34)
MCHC RBC AUTO-ENTMCNC: 33.3 G/DL (ref 31–36)
MCV RBC AUTO: 84.2 FL (ref 80–100)
MICROSCOPIC EXAMINATION: YES
MONOCYTES ABSOLUTE: 1.3 K/UL (ref 0–1.3)
MONOCYTES RELATIVE PERCENT: 13.4 %
NEUTROPHILS ABSOLUTE: 5.3 K/UL (ref 1.7–7.7)
NEUTROPHILS RELATIVE PERCENT: 56.4 %
NITRITE, URINE: NEGATIVE
PDW BLD-RTO: 13.9 % (ref 12.4–15.4)
PH UA: 5.5 (ref 5–8)
PLATELET # BLD: 232 K/UL (ref 135–450)
PMV BLD AUTO: 8 FL (ref 5–10.5)
POTASSIUM REFLEX MAGNESIUM: 3.6 MMOL/L (ref 3.5–5.1)
PROTEIN UA: NEGATIVE MG/DL
RBC # BLD: 4.78 M/UL (ref 4–5.2)
RBC UA: 2 /HPF (ref 0–4)
SODIUM BLD-SCNC: 137 MMOL/L (ref 136–145)
SPECIFIC GRAVITY UA: 1.01 (ref 1–1.03)
TOTAL PROTEIN: 8 G/DL (ref 6.4–8.2)
TROPONIN: <0.01 NG/ML
URINE REFLEX TO CULTURE: ABNORMAL
URINE TYPE: ABNORMAL
UROBILINOGEN, URINE: 0.2 E.U./DL
WBC # BLD: 9.4 K/UL (ref 4–11)
WBC UA: 5 /HPF (ref 0–5)

## 2021-04-13 PROCEDURE — 81001 URINALYSIS AUTO W/SCOPE: CPT

## 2021-04-13 PROCEDURE — 99282 EMERGENCY DEPT VISIT SF MDM: CPT

## 2021-04-13 PROCEDURE — 84484 ASSAY OF TROPONIN QUANT: CPT

## 2021-04-13 PROCEDURE — 85025 COMPLETE CBC W/AUTO DIFF WBC: CPT

## 2021-04-13 PROCEDURE — 93005 ELECTROCARDIOGRAM TRACING: CPT | Performed by: EMERGENCY MEDICINE

## 2021-04-13 PROCEDURE — 96375 TX/PRO/DX INJ NEW DRUG ADDON: CPT

## 2021-04-13 PROCEDURE — 71046 X-RAY EXAM CHEST 2 VIEWS: CPT

## 2021-04-13 PROCEDURE — 6360000002 HC RX W HCPCS: Performed by: EMERGENCY MEDICINE

## 2021-04-13 PROCEDURE — 93010 ELECTROCARDIOGRAM REPORT: CPT | Performed by: INTERNAL MEDICINE

## 2021-04-13 PROCEDURE — 80053 COMPREHEN METABOLIC PANEL: CPT

## 2021-04-13 PROCEDURE — 96374 THER/PROPH/DIAG INJ IV PUSH: CPT

## 2021-04-13 RX ORDER — ONDANSETRON 2 MG/ML
4 INJECTION INTRAMUSCULAR; INTRAVENOUS ONCE
Status: COMPLETED | OUTPATIENT
Start: 2021-04-13 | End: 2021-04-13

## 2021-04-13 RX ORDER — KETOROLAC TROMETHAMINE 30 MG/ML
30 INJECTION, SOLUTION INTRAMUSCULAR; INTRAVENOUS ONCE
Status: COMPLETED | OUTPATIENT
Start: 2021-04-13 | End: 2021-04-13

## 2021-04-13 RX ORDER — BENZONATATE 100 MG/1
100 CAPSULE ORAL 2 TIMES DAILY PRN
Qty: 14 CAPSULE | Refills: 0 | Status: SHIPPED | OUTPATIENT
Start: 2021-04-13 | End: 2021-04-20

## 2021-04-13 RX ADMIN — ONDANSETRON 4 MG: 2 INJECTION INTRAMUSCULAR; INTRAVENOUS at 05:40

## 2021-04-13 RX ADMIN — KETOROLAC TROMETHAMINE 30 MG: 30 INJECTION, SOLUTION INTRAMUSCULAR at 05:40

## 2021-04-13 ASSESSMENT — ENCOUNTER SYMPTOMS
STRIDOR: 0
CHEST TIGHTNESS: 1
DIARRHEA: 0
VOICE CHANGE: 0
COUGH: 1
PHOTOPHOBIA: 0
VOMITING: 1
CONSTIPATION: 0
EYE PAIN: 0
WHEEZING: 0
FACIAL SWELLING: 0
NAUSEA: 1
TROUBLE SWALLOWING: 0
SINUS PAIN: 0
ABDOMINAL PAIN: 0
SINUS PRESSURE: 0
BACK PAIN: 0

## 2021-04-13 ASSESSMENT — PAIN SCALES - GENERAL: PAINLEVEL_OUTOF10: 6

## 2021-04-13 NOTE — ED PROVIDER NOTES
eMERGENCY dEPARTMENT eNCOUnter      Pt Name: Jose Alejandro Crandall  MRN: [id number]  [place] 1965  Date of evaluation: 4/13/2021  Provider: Renaee Prader, MD     [address] [place] [address]       Chief Complaint   Patient presents with    Cough     patient had covid vaccine on friday, complaints of SOB, cough and CP with cough         HISTORY OF PRESENT ILLNESS   (Location/Symptom, Timing/Onset,Context/Setting, Quality, Duration, Modifying Factors, Severity) Note limiting factors. Jose Alejandro Crandall is a 60 y.o. female who presents to the emergency department with cough worse with deep breathing, nausea, vomiting, postnasal drip and pleuritic chest pain which she reports started Friday after receiving her first dose of the Covid 19 vaccine. Cough is non productive. Exacerbated by deep breathing and chest hurts while coughing. Did have 1 episode of nausea and vomiting after coughing. Has not tried any medications at home for symptoms. REVIEW OFSYSTEMS    (2+ for level 4; 10+ for level 5)   Review of Systems   Constitutional: Positive for chills and fever. Negative for activity change, appetite change and diaphoresis. HENT: Positive for postnasal drip. Negative for dental problem, ear pain, facial swelling, sinus pressure, sinus pain, trouble swallowing and voice change. Eyes: Negative for photophobia and pain. Respiratory: Positive for cough and chest tightness. Negative for wheezing and stridor. Cardiovascular: Positive for chest pain. Negative for palpitations and leg swelling. Gastrointestinal: Positive for nausea and vomiting. Negative for abdominal pain, constipation and diarrhea. Genitourinary: Negative for dysuria and hematuria. Musculoskeletal: Positive for myalgias. Negative for back pain and neck pain. Neurological: Positive for headaches. Negative for dizziness, syncope, speech difficulty and numbness. Hematological: Negative. Psychiatric/Behavioral: Negative.     All other systems reviewed and are negative.         PAST MEDICAL HISTORY     Past Medical History:   Diagnosis Date    Abnormal Pap smear     ASCUS (atypical squamous cells of undetermined significance) on gynecologic Papanicolaou smear complicating pregnancy, antepartum 5/2015    Endometrial thickening on ultra sound 10/2015    GERD (gastroesophageal reflux disease)     HIGH CHOLESTEROL     Hypertension     Migraines     Moderate dysplasia of cervix 8/2013       SURGICAL HISTORY       Past Surgical History:   Procedure Laterality Date    BREAST SURGERY  2003    left     COLONOSCOPY N/A 3/19/2019    COLONOSCOPY POLYPECTOMY SNARE/COLD BIOPSY performed by Ronnald Closs, MD at [address] COLONOSCOPY N/A 3/19/2019    COLONOSCOPY WITH BIOPSY performed by Ronnald Closs, MD at [address]  5/5/2013    ENDOMETRIAL BIOPSY      HEMORROIDECTOMY N/A 4/18/2019    COLORECTAL EXAM UNDER ANESTHESIA WITH RECTAL POLYPECTOMY performed by Antony John MD at [place]       Discharge Medication List as of 4/13/2021  6:22 AM      CONTINUE these medications which have NOT CHANGED    Details   butalbital-acetaminophen-caffeine (FIORICET, ESGIC) -40 MG per tablet Take 1 tablet by mouth every 6 hours as needed for Headaches or Migraine, Disp-20 tablet,R-0Print      triamterene-hydrochlorothiazide (MAXZIDE-25) 37.5-25 MG per tablet Take 1 tablet by mouth Daily with lunch Takes daily at 1400Historical Med             ALLERGIES     Aspirin    FAMILY HISTORY       Family History   Problem Relation Age of Onset    Rheum Arthritis Neg Hx     Osteoarthritis Neg Hx     Asthma Neg Hx     Breast Cancer Neg Hx     Cancer Neg Hx     Diabetes Neg Hx     Heart Failure Neg Hx     High Cholesterol Neg Hx     Hypertension Neg Hx     Migraines Neg Hx     Ovarian Cancer Neg Hx     Rashes/Skin Problems Neg Hx     Seizures Neg Hx     Stroke Neg Hx     Thyroid Disease Neg Hx         SOCIAL HISTORY       Social History     Socioeconomic History    Marital status:      Spouse name: Not on file    Number of children: Not on file    Years of education: Not on file    Highest education level: Not on file   Occupational History    Not on file   Social Needs    Financial resource strain: Not on file    Food insecurity     Worry: Not on file     Inability: Not on file    Transportation needs     Medical: Not on file     Non-medical: Not on file   Tobacco Use    Smoking status: Never Smoker    Smokeless tobacco: Never Used   Substance and Sexual Activity    Alcohol use: No     Alcohol/week: 0.0 standard drinks    Drug use: No    Sexual activity: Yes     Partners: Male   Lifestyle    Physical activity     Days per week: Not on file     Minutes per session: Not on file    Stress: Not on file   Relationships    Social connections     Talks on phone: Not on file     Gets together: Not on file     Attends Synagogue service: Not on file     Active member of club or organization: Not on file     Attends meetings of clubs or organizations: Not on file     Relationship status: Not on file    Intimate partner violence     Fear of current or ex partner: Not on file     Emotionally abused: Not on file     Physically abused: Not on file     Forced sexual activity: Not on file   Other Topics Concern    Not on file   Social History Narrative    Not on file       SCREENINGS           PHYSICAL EXAM    (up to 7 for level 4, 8 or more for level 5)     ED Triage Vitals [04/13/21 0505]   BP Temp Temp src Pulse Resp SpO2 Height Weight   136/75 99 °F (37.2 °C) -- 79 16 99 % -- --       Physical Exam  Vitals signs and nursing note reviewed. Constitutional:       General: She is not in acute distress. Appearance: She is obese. She is not ill-appearing, toxic-appearing or diaphoretic. HENT:      Head: Normocephalic and atraumatic.       Right Ear: External ear normal.      Left Ear: External ear normal.      Nose: Nose normal.      Mouth/Throat:      Mouth: Mucous membranes are moist.      Pharynx: Oropharynx is clear. No oropharyngeal exudate or posterior oropharyngeal erythema. Eyes:      General: No scleral icterus. Right eye: No discharge. Left eye: No discharge. Extraocular Movements: Extraocular movements intact. Pupils: Pupils are equal, round, and reactive to light. Neck:      Musculoskeletal: Normal range of motion and neck supple. No neck rigidity or muscular tenderness. Cardiovascular:      Rate and Rhythm: Normal rate and regular rhythm. Pulses: Normal pulses. Heart sounds: Normal heart sounds. No murmur. No gallop. Pulmonary:      Effort: Pulmonary effort is normal. No respiratory distress. Breath sounds: Normal breath sounds. No stridor. No wheezing or rales. Abdominal:      General: Abdomen is flat. There is no distension. Palpations: Abdomen is soft. Tenderness: There is no abdominal tenderness. Skin:     General: Skin is warm and dry. Capillary Refill: Capillary refill takes less than 2 seconds. Neurological:      General: No focal deficit present. Mental Status: She is alert and oriented to person, place, and time. Psychiatric:         Mood and Affect: Mood normal.         Behavior: Behavior normal.         DIAGNOSTIC RESULTS     EKG (Per Emergency Physician):   Normal sinus rhythm ventricular of 80 left atrial enlargement nonspecific ST changes noted. RADIOLOGY (Per Emergency Physician): Interpretation per the Radiologist below, if available at the time of this note:  Xr Chest (2 Vw)    Result Date: 4/13/2021  EXAMINATION: TWO XRAY VIEWS OF THE CHEST 4/13/2021 5:16 am COMPARISON: None. HISTORY: ORDERING SYSTEM PROVIDED HISTORY: chest pain, sob, cough TECHNOLOGIST PROVIDED HISTORY: Reason for exam:->chest pain, sob, cough Reason for Exam: cough FINDINGS: The lungs are clear.  The mediastinum and cardiac silhouette are unremarkable. No acute abnormality. ED BEDSIDE ULTRASOUND:   Performed by ED Physician - none    LABS:  Labs Reviewed   COMPREHENSIVE METABOLIC PANEL W/ REFLEX TO MG FOR LOW K - Abnormal; Notable for the following components:       Result Value    Glucose 116 (*)     All other components within normal limits    Narrative:     Performed at:  [address] [email or web address] [address]   Phone [phone]   URINE RT REFLEX TO CULTURE - Abnormal; Notable for the following components:    Blood, Urine TRACE-INTACT (*)     Leukocyte Esterase, Urine TRACE (*)     All other components within normal limits    Narrative:     Performed at:  [address] [email or web address] [address]   Phone [phone]   MICROSCOPIC URINALYSIS - Abnormal; Notable for the following components:    Bacteria, UA 1+ (*)     Epithelial Cells, UA 22 (*)     All other components within normal limits    Narrative:     Performed at:  [address] [email or web address] [email or web address] [address]   Phone [phone]   TROPONIN    Narrative:     Performed at:  [address] [email or web address] [address]   Phone [phone]   CBC WITH AUTO DIFFERENTIAL    Narrative:     Performed at:  [address] [email or web address] [email or web address] [address]   Phone [phone]        All other labs were within normal range or not returned as of this dictation.       Procedures      EMERGENCY DEPARTMENT COURSE and DIFFERENTIAL DIAGNOSIS/MDM:   Vitals:    Vitals:    04/13/21 0532 04/13/21 0547 04/13/21 0601 04/13/21 0616   BP: 134/80 113/76 113/69 106/69   Pulse: 78 75 73 75   Resp: 17 21 18 17   Temp:       SpO2: 100% 99% 98% 99%       Medications   ondansetron (ZOFRAN) injection 4 mg (4 mg Intravenous Given 4/13/21 0540) ketorolac (TORADOL) injection 30 mg (30 mg Intravenous Given 4/13/21 0540)       MDM. 41-year-old presents with a cough after getting vaccine 3 to 4 days ago. It was advised vaccine. Complains of mild headache cough chest pain and some shortness of breath. Otherwise in no distress. Exam is unremarkable. Work-up was unremarkable chest x-ray no evidence of pneumonia or infiltrate. Patient feeling better after some Tylenol. Reassurance follow-up discharge in good condition. I suspect mostly is could be Covid vaccine related. I do not see anything new at this point. Patient should be symptomatic relief and treatment. REVAL:         CRITICAL CARE TIME   Total CriticalCare time was 0 minutes, excluding separately reportable procedures. There was a high probability of clinically significant/life threatening deterioration in the patient's condition which required my urgent intervention. CONSULTS:  None    PROCEDURES:  Unless otherwise noted below, none     [unfilled]    FINAL IMPRESSION      1. Cough          DISPOSITION/PLAN   DISPOSITION Decision To Discharge 04/13/2021 06:16:22 AM      PATIENT REFERRED TO:  Sukhjinder Rendon MD  [address] [place] [address],[address]  [phone]    Call in 3 days  If symptoms worsen      DISCHARGE MEDICATIONS:  Discharge Medication List as of 4/13/2021  6:22 AM      START taking these medications    Details   benzonatate (TESSALON PERLES) 100 MG capsule Take 1 capsule by mouth 2 times daily as needed for Cough, Disp-14 capsule, R-0Print                (Please note:  Portions of this note were completed with a voice recognition program.Efforts were made to edit the dictations but occasionally words and phrases are mis-transcribed.)  Form v2016. J.[id number]    Adela HARLEY MD (electronically signed)  Emergency Medicine Provider          Jose Alfredo Demarco MD  04/13/21 0679

## 2021-04-15 ENCOUNTER — HOSPITAL ENCOUNTER (EMERGENCY)
Age: 56
Discharge: HOME | End: 2021-04-15
Payer: MEDICAID

## 2021-04-15 ENCOUNTER — APPOINTMENT (OUTPATIENT)
Dept: GENERAL RADIOLOGY | Age: 56
End: 2021-04-15
Payer: MEDICAID

## 2021-04-15 VITALS
OXYGEN SATURATION: 99 % | SYSTOLIC BLOOD PRESSURE: 128 MMHG | DIASTOLIC BLOOD PRESSURE: 74 MMHG | RESPIRATION RATE: 20 BRPM | HEART RATE: 76 BPM | TEMPERATURE: 97 F

## 2021-04-15 DIAGNOSIS — R05.9 COUGH: ICD-10-CM

## 2021-04-15 DIAGNOSIS — J18.9 ATYPICAL PNEUMONIA: Primary | ICD-10-CM

## 2021-04-15 LAB
EKG ATRIAL RATE: 77 BPM
EKG DIAGNOSIS: NORMAL
EKG P AXIS: 66 DEGREES
EKG P-R INTERVAL: 140 MS
EKG Q-T INTERVAL: 374 MS
EKG QRS DURATION: 84 MS
EKG QTC CALCULATION (BAZETT): 423 MS
EKG R AXIS: 22 DEGREES
EKG T AXIS: 36 DEGREES
EKG VENTRICULAR RATE: 77 BPM

## 2021-04-15 PROCEDURE — 96372 THER/PROPH/DIAG INJ SC/IM: CPT

## 2021-04-15 PROCEDURE — 71045 X-RAY EXAM CHEST 1 VIEW: CPT

## 2021-04-15 PROCEDURE — 6360000002 HC RX W HCPCS: Performed by: PHYSICIAN ASSISTANT

## 2021-04-15 PROCEDURE — 93010 ELECTROCARDIOGRAM REPORT: CPT | Performed by: INTERNAL MEDICINE

## 2021-04-15 PROCEDURE — 99283 EMERGENCY DEPT VISIT LOW MDM: CPT

## 2021-04-15 PROCEDURE — 93005 ELECTROCARDIOGRAM TRACING: CPT | Performed by: EMERGENCY MEDICINE

## 2021-04-15 RX ORDER — ORPHENADRINE CITRATE 30 MG/ML
60 INJECTION INTRAMUSCULAR; INTRAVENOUS ONCE
Status: COMPLETED | OUTPATIENT
Start: 2021-04-15 | End: 2021-04-15

## 2021-04-15 RX ORDER — GUAIFENESIN 600 MG/1
600 TABLET, EXTENDED RELEASE ORAL 2 TIMES DAILY
Qty: 30 TABLET | Refills: 0 | Status: SHIPPED | OUTPATIENT
Start: 2021-04-15 | End: 2021-04-30

## 2021-04-15 RX ORDER — AZITHROMYCIN 250 MG/1
250 TABLET, FILM COATED ORAL SEE ADMIN INSTRUCTIONS
Qty: 6 TABLET | Refills: 0 | Status: SHIPPED | OUTPATIENT
Start: 2021-04-15 | End: 2021-04-20

## 2021-04-15 RX ORDER — KETOROLAC TROMETHAMINE 30 MG/ML
30 INJECTION, SOLUTION INTRAMUSCULAR; INTRAVENOUS ONCE
Status: COMPLETED | OUTPATIENT
Start: 2021-04-15 | End: 2021-04-15

## 2021-04-15 RX ORDER — CEFUROXIME AXETIL 500 MG/1
500 TABLET ORAL 2 TIMES DAILY
Qty: 20 TABLET | Refills: 0 | Status: SHIPPED | OUTPATIENT
Start: 2021-04-15 | End: 2021-04-25

## 2021-04-15 RX ADMIN — KETOROLAC TROMETHAMINE 30 MG: 30 INJECTION, SOLUTION INTRAMUSCULAR at 11:06

## 2021-04-15 RX ADMIN — ORPHENADRINE CITRATE 60 MG: 30 INJECTION INTRAMUSCULAR; INTRAVENOUS at 11:07

## 2021-04-15 ASSESSMENT — PAIN DESCRIPTION - DESCRIPTORS: DESCRIPTORS: ACHING

## 2021-04-15 ASSESSMENT — PAIN DESCRIPTION - PAIN TYPE
TYPE: ACUTE PAIN
TYPE: ACUTE PAIN

## 2021-04-15 ASSESSMENT — PAIN DESCRIPTION - LOCATION: LOCATION: HEAD

## 2021-04-15 NOTE — ED PROVIDER NOTES
EKG Interpretation    Interpreted by emergency department physician  Time performed: 0915  Time read: 0920    Rhythm: Sinus  Ventricular Rate: 77  QRS Axis: 22  Ectopy: None  Conduction: Normal sinus rhythm with left atrial enlargement and LVH by voltage with early repolarization abnormalities  ST Segments: Consistent with LVH and early repolarization abnormalities  T Waves: Consistent with LVH and early repolarization abnormalities  Q Waves: None noted    Other findings: Motion artifact but EKG is readable    Compared to EKG on: 4/13/2021 and appears unchanged except for a slight axis shift from 15 degrees from 37 degrees to 22 degrees. Clinical Impression: Normal sinus rhythm left atrial enlargement and LVH by voltage with early repolarization abnormalities. There is a mild axis shift from 37 degrees to 22 degrees from 4/13/2021. There are no ischemic changes noted at this time.     [place], [place]  04/15/21 1031

## 2021-04-15 NOTE — ED PROVIDER NOTES
**ADVANCED PRACTICE PROVIDER, I HAVE EVALUATED THIS PATIENT**        [address]      Pt Name: Cliff Pascual  [facility]:[id number]  [place] 1965  Date of evaluation: 4/15/2021  Provider: Maine Cid PA-C      Chief Complaint:    Chief Complaint   Patient presents with    Shortness of Breath     Patient was in ED day before yesterday for same complaint and states no improvement        Nursing Notes, Past Medical Hx, Past Surgical Hx, Social Hx, Allergies, and Family Hx were all reviewed and agreed with or any disagreements were addressed in the HPI.    HPI:  (Location, Duration, Timing, Severity, Quality, Assoc Sx, Context, Modifying factors)  This is a  60 y.o. female who indicates that her cough is a bit worse than it was 2 to 3 days ago. Patient does speak English as a second language and she brings a family member with her that she would like to provide interpretation for her. Reportedly patient did have her first [place] COVID-19 injection on Friday. However even for a few days before that injection patient was having some cough that is out of usual for her. She states that the next day on Saturday she felt a bit feverish after having the injection but that was the only day. She has continued to have some cough that has been gradually worsening since. She was seen here in the emergency department 2 to 3 days ago and evaluated and the chest x-ray and work-up were okay. She was given some cough medication which she has been using but it does not help. Patient states that she is coughing more than she was 2 to 3 days ago and that is worse when she tries to lay down at night. She feels a bit of a rattle in the left chest that is unusual and that when she coughs it hurts the ribs underneath the left breast area. It is also tender there when she pushes on it as well as some in the left upper trapezius area as she indicates.   No difficulty getting air at this time however when patient gets into a coughing fit at night sometimes she feels a bit short of breath. No previous heart or lung issues or any recent travel immobilization or surgery. No bleeding or clotting disorders known in the patient. She is not a smoker and does not use alcohol. No known infectious disease exposures per patient. PastMedical/Surgical History:      Diagnosis Date    Abnormal Pap smear     ASCUS (atypical squamous cells of undetermined significance) on gynecologic Papanicolaou smear complicating pregnancy, antepartum 5/2015    Endometrial thickening on ultra sound 10/2015    GERD (gastroesophageal reflux disease)     HIGH CHOLESTEROL     Hypertension     Migraines     Moderate dysplasia of cervix 8/2013         Procedure Laterality Date    BREAST SURGERY  2003    left     COLONOSCOPY N/A 3/19/2019    COLONOSCOPY POLYPECTOMY SNARE/COLD BIOPSY performed by Edna Gaspar MD at [address] COLONOSCOPY N/A 3/19/2019    COLONOSCOPY WITH BIOPSY performed by Edna Gaspar MD at [address] [place] [address]  5/5/2013    ENDOMETRIAL BIOPSY      HEMORROIDECTOMY N/A 4/18/2019    COLORECTAL EXAM UNDER ANESTHESIA WITH RECTAL POLYPECTOMY performed by Kendra Long MD at [address] [place] [address]         Medications:  Previous Medications    BENZONATATE (TESSALON PERLES) 100 MG CAPSULE    Take 1 capsule by mouth 2 times daily as needed for Cough    BUTALBITAL-ACETAMINOPHEN-CAFFEINE (FIORICET, ESGIC) -40 MG PER TABLET    Take 1 tablet by mouth every 6 hours as needed for Headaches or Migraine    TRIAMTERENE-HYDROCHLOROTHIAZIDE (MAXZIDE-25) 37.5-25 MG PER TABLET    Take 1 tablet by mouth Daily with lunch Takes daily at 1400         Review of Systems:  Review of Systems   Positive history as above with no acute fall contusion or twisting injury. No ongoing lung or heart issues.   Patient is having some generalized headache and worse TECHNOLOGIST PROVIDED HISTORY: Reason for exam:->cough worse Reason for Exam: cough Acuity: Acute Type of Exam: Initial FINDINGS: There is airspace disease at the left lung base partially obscuring the diaphragm. This is new from the prior study. Right lung is clear. Pulmonary vessels are normal.  Heart size is normal.     New airspace disease, left lung base most likely representing pneumonia. MEDICAL DECISION MAKING / ED COURSE:      PROCEDURES:   Procedures    None    Patient was given:  Medications   ketorolac (TORADOL) injection 30 mg (30 mg Intramuscular Given 4/15/21 1106)   orphenadrine (NORFLEX) injection 60 mg (60 mg Intramuscular Given 4/15/21 1107)     Patient presents as above and evaluation and treatment is begun here. Patient is afebrile, no tachycardia tachypnea or recruitment. No hypoxia or respiratory fatigue. She has been having some cough that is gradually worse especially over the last few days or so. So far it is not productive but patient does notice an extra sound associated and some chest wall tenderness as noted above. Repeat chest x-ray obtained and shows the new infiltrate present left lower lung field when compared to the x-rays from a couple of days ago. This agrees with physical exam findings and history indicating community-acquired pneumonia, consistent with atypical pneumonia. No indication of acute compromise at this time including likely PE or thoracic aortic section, acute coronary syndrome, respiratory fatigue or compromise or sepsis. Beginning appropriate p.o. antibiotics at home as well as other supportive care and close outpatient follow-up with primary care provider is indicated and patient states that she can accomplish this. The patient tolerated their visit well. I evaluated the patient. The physician was available for consultation as needed.   The patient and / or the family were informed of the results of any tests, a time was given to answer questions, a plan was proposed and they agreed with plan. Home in stable condition to drink plenty of water, use medications as written, elevate head and shoulders at rest, and monitor for gradual improvement. May also use over-the-counter medication such as ibuprofen for muscle tenderness. Schedule an outpatient follow-up with your family doctor for Monday for further care and treatment. Return to the emergency department for any emergency worsen or concern. CLINICAL IMPRESSION:  1. Atypical pneumonia    2. Cough        DISPOSITION Decision To Discharge 04/15/2021 11:51:35 AM      PATIENT REFERRED TO:  Amanda Whitney MD  [address] [place] [address],[address]  [phone]    Schedule an appointment as soon as possible for a visit   for recheck and further care on Monday      DISCHARGE MEDICATIONS:  New Prescriptions    AZITHROMYCIN (ZITHROMAX) 250 MG TABLET    Take 1 tablet by mouth See Admin Instructions for 5 days 500mg on day 1 followed by 250mg on days 2 - 5    CEFUROXIME (CEFTIN) 500 MG TABLET    Take 1 tablet by mouth 2 times daily for 10 days    GUAIFENESIN (MUCINEX) 600 MG EXTENDED RELEASE TABLET    Take 1 tablet by mouth 2 times daily for 15 days    HYDROCODONE-HOMATROPINE (HYCODAN) 5-1.5 MG/5ML SYRUP    Take 2.5 mLs by mouth nightly as needed (cough) for up to 7 days. Caution, causes drowsiness. Take appropriate care.        DISCONTINUED MEDICATIONS:  Discontinued Medications    No medications on file              (Please note the MDM and HPI sections of this note were completed with a voice recognition program.  Efforts were made to edit the dictations but occasionally words are mis-transcribed.)    Electronically signed, Yuli Mcbride PA-C,          Yuli Mcbride PA-C  04/15/21 4073

## 2021-04-15 NOTE — ED NOTES
Pt states dry cough, seen here two days ago for same complaint. rx tessalon pearls with no relief. Pt received covid vaccine Friday.  States body aches on left side and GARCIA since      Sania Pendleton RN  04/15/21 101

## 2021-06-14 ENCOUNTER — HOSPITAL ENCOUNTER (OUTPATIENT)
Dept: WOMENS IMAGING | Age: 56
Discharge: HOME | End: 2021-06-14
Payer: MEDICAID

## 2021-06-14 DIAGNOSIS — Z12.31 VISIT FOR SCREENING MAMMOGRAM: ICD-10-CM

## 2021-06-14 PROCEDURE — 77067 SCR MAMMO BI INCL CAD: CPT

## 2021-06-28 ENCOUNTER — OFFICE VISIT (OUTPATIENT)
Dept: GYNECOLOGY | Age: 56
End: 2021-06-28
Payer: MEDICAID

## 2021-06-28 VITALS
HEART RATE: 86 BPM | HEIGHT: 66 IN | WEIGHT: 213.2 LBS | RESPIRATION RATE: 17 BRPM | SYSTOLIC BLOOD PRESSURE: 120 MMHG | BODY MASS INDEX: 34.27 KG/M2 | OXYGEN SATURATION: 96 % | DIASTOLIC BLOOD PRESSURE: 78 MMHG

## 2021-06-28 DIAGNOSIS — Z01.419 WELL WOMAN EXAM WITH ROUTINE GYNECOLOGICAL EXAM: Primary | ICD-10-CM

## 2021-06-28 PROCEDURE — 99396 PREV VISIT EST AGE 40-64: CPT | Performed by: OBSTETRICS & GYNECOLOGY

## 2021-06-28 ASSESSMENT — ENCOUNTER SYMPTOMS
GASTROINTESTINAL NEGATIVE: 1
EYES NEGATIVE: 1
RESPIRATORY NEGATIVE: 1

## 2021-06-29 NOTE — PROGRESS NOTES
Subjective:      Patient ID: Jason Rajput is a 60 y.o. female. Patient is here for annual. Patient in menopause. Gynecologic Exam        Review of Systems   Constitutional: Negative. HENT: Negative. Eyes: Negative. Respiratory: Negative. Cardiovascular: Negative. Gastrointestinal: Negative. Genitourinary: Negative. Musculoskeletal: Negative. Skin: Negative. Neurological: Negative. Psychiatric/Behavioral: Negative.       Date of Birth 1965  Past Medical History:   Diagnosis Date    Abnormal Pap smear     ASCUS (atypical squamous cells of undetermined significance) on gynecologic Papanicolaou smear complicating pregnancy, antepartum 2015    Endometrial thickening on ultra sound 10/2015    GERD (gastroesophageal reflux disease)     HIGH CHOLESTEROL     Hypertension     Migraines     Moderate dysplasia of cervix 2013     Past Surgical History:   Procedure Laterality Date    BREAST SURGERY      left     COLONOSCOPY N/A 3/19/2019    COLONOSCOPY POLYPECTOMY SNARE/COLD BIOPSY performed by Omid Henderson MD at [address] COLONOSCOPY N/A 3/19/2019    COLONOSCOPY WITH BIOPSY performed by Omid Henderson MD at [address]  2013    ENDOMETRIAL BIOPSY      HEMORROIDECTOMY N/A 2019    COLORECTAL EXAM UNDER ANESTHESIA WITH RECTAL POLYPECTOMY performed by Claire Dennis MD at [address] [facility]       OB History    Para Term  AB Living   5 3 3   2 3   SAB TAB Ectopic Molar Multiple Live Births   2         3      # Outcome Date GA Lbr Fuentes/2nd Weight Sex Delivery Anes PTL Lv   5 Term  40w0d   M    CATHRYN   4 SAB 2005           3 Term  40w0d   M Vag-Spont   CATHRYN   2 SAB            1 Term  37w0d   F Vag-Spont   CATHRYN     Social History     Socioeconomic History    Marital status:      Spouse name: Not on file    Number of children: Not on file    Years of education: Not on file   Lexus Thomson Cancer Neg Hx     Cancer Neg Hx     Diabetes Neg Hx     Heart Failure Neg Hx     High Cholesterol Neg Hx     Hypertension Neg Hx     Migraines Neg Hx     Ovarian Cancer Neg Hx     Rashes/Skin Problems Neg Hx     Seizures Neg Hx     Stroke Neg Hx     Thyroid Disease Neg Hx      /78 (Site: Right Upper Arm, Position: Sitting, Cuff Size: Large Adult)   Pulse 86   Resp 17   Ht 5' 6\" (1.676 m)   Wt 213 lb 3.2 oz (96.7 kg)   LMP 06/06/2013   SpO2 96%   Breastfeeding No   BMI 34.41 kg/m²       Objective:   Physical Exam  Constitutional:       General: She is not in acute distress. Appearance: Normal appearance. She is well-developed and normal weight. She is not diaphoretic. HENT:      Head: Normocephalic and atraumatic. Nose: Nose normal.      Mouth/Throat:      Mouth: Mucous membranes are moist.      Pharynx: Oropharynx is clear. Eyes:      Pupils: Pupils are equal, round, and reactive to light. Neck:      Thyroid: No thyromegaly. Cardiovascular:      Rate and Rhythm: Normal rate and regular rhythm. Heart sounds: Normal heart sounds. No murmur heard. No friction rub. No gallop. Pulmonary:      Effort: Pulmonary effort is normal. No respiratory distress. Breath sounds: Normal breath sounds. No wheezing or rales. Chest:      Breasts:         Right: Normal. No swelling, bleeding, inverted nipple, mass, nipple discharge, skin change or tenderness. Left: Normal. No swelling, bleeding, inverted nipple, mass, nipple discharge, skin change or tenderness. Abdominal:      General: Abdomen is flat. Bowel sounds are normal. There is no distension. Palpations: Abdomen is soft. There is no hepatomegaly or mass. Tenderness: There is no abdominal tenderness. There is no guarding or rebound. Hernia: No hernia is present. There is no hernia in the left inguinal area. Genitourinary:     General: Normal vulva. Exam position: Lithotomy position. Pubic Area: No rash. Labia:         Right: No rash, tenderness, lesion or injury. Left: No rash, tenderness, lesion or injury. Urethra: No prolapse, urethral pain, urethral swelling or urethral lesion. Vagina: Normal. No signs of injury and foreign body. No vaginal discharge, erythema, tenderness or bleeding. Cervix: No cervical motion tenderness, discharge, friability, lesion, erythema, cervical bleeding or eversion. Uterus: Not deviated, not enlarged, not fixed, not tender and no uterine prolapse. Adnexa:         Right: No mass, tenderness or fullness. Left: No mass, tenderness or fullness. Rectum: Normal. Guaiac result negative. No mass, tenderness, anal fissure, external hemorrhoid or internal hemorrhoid. Normal anal tone. Comments: Normal urethral meatus, nl urethra, nl bladder. Musculoskeletal:         General: No tenderness. Normal range of motion. Cervical back: Normal range of motion and neck supple. No rigidity. Lymphadenopathy:      Cervical: No cervical adenopathy. Lower Body: No right inguinal adenopathy. No left inguinal adenopathy. Skin:     General: Skin is warm and dry. Findings: No erythema or rash. Neurological:      General: No focal deficit present. Mental Status: She is alert and oriented to person, place, and time. Deep Tendon Reflexes: Reflexes are normal and symmetric. Psychiatric:         Mood and Affect: Mood normal.         Behavior: Behavior normal.         Thought Content: Thought content normal.         Judgment: Judgment normal.         Assessment:      1. Annual  2. menopaue      Plan:      1.  Pap, calcium, exercise, mammogram, hemocult negative  2. domo Zacarias MD

## 2022-07-14 ENCOUNTER — HOSPITAL ENCOUNTER (OUTPATIENT)
Dept: WOMENS IMAGING | Age: 57
Discharge: HOME | End: 2022-07-14
Payer: MEDICAID

## 2022-07-14 DIAGNOSIS — Z12.31 VISIT FOR SCREENING MAMMOGRAM: ICD-10-CM

## 2022-07-14 PROCEDURE — 77067 SCR MAMMO BI INCL CAD: CPT

## 2022-08-10 ENCOUNTER — APPOINTMENT (OUTPATIENT)
Dept: GENERAL RADIOLOGY | Age: 57
End: 2022-08-10
Payer: MEDICAID

## 2022-08-10 ENCOUNTER — APPOINTMENT (OUTPATIENT)
Dept: CT IMAGING | Age: 57
End: 2022-08-10
Payer: MEDICAID

## 2022-08-10 ENCOUNTER — HOSPITAL ENCOUNTER (EMERGENCY)
Age: 57
Discharge: HOME | End: 2022-08-10
Attending: EMERGENCY MEDICINE
Payer: MEDICAID

## 2022-08-10 VITALS
BODY MASS INDEX: 33.69 KG/M2 | HEIGHT: 66 IN | SYSTOLIC BLOOD PRESSURE: 130 MMHG | RESPIRATION RATE: 16 BRPM | DIASTOLIC BLOOD PRESSURE: 78 MMHG | OXYGEN SATURATION: 100 % | WEIGHT: 209.66 LBS | TEMPERATURE: 98.4 F | HEART RATE: 56 BPM

## 2022-08-10 DIAGNOSIS — R07.9 CHEST PAIN, UNSPECIFIED TYPE: Primary | ICD-10-CM

## 2022-08-10 LAB
A/G RATIO: 1.1 (ref 1.1–2.2)
ALBUMIN SERPL-MCNC: 3.8 G/DL (ref 3.4–5)
ALP BLD-CCNC: 94 U/L (ref 40–129)
ALT SERPL-CCNC: 8 U/L (ref 10–40)
ANION GAP SERPL CALCULATED.3IONS-SCNC: 11 MMOL/L (ref 3–16)
AST SERPL-CCNC: 13 U/L (ref 15–37)
BASOPHILS ABSOLUTE: 0.1 K/UL (ref 0–0.2)
BASOPHILS RELATIVE PERCENT: 0.9 %
BILIRUB SERPL-MCNC: 0.4 MG/DL (ref 0–1)
BUN BLDV-MCNC: 12 MG/DL (ref 7–20)
CALCIUM SERPL-MCNC: 10.2 MG/DL (ref 8.3–10.6)
CHLORIDE BLD-SCNC: 103 MMOL/L (ref 99–110)
CO2: 25 MMOL/L (ref 21–32)
CREAT SERPL-MCNC: 0.6 MG/DL (ref 0.6–1.1)
D DIMER: 1.02 UG/ML FEU (ref 0–0.6)
EOSINOPHILS ABSOLUTE: 0.1 K/UL (ref 0–0.6)
EOSINOPHILS RELATIVE PERCENT: 1.4 %
GFR AFRICAN AMERICAN: >60
GFR NON-AFRICAN AMERICAN: >60
GLUCOSE BLD-MCNC: 87 MG/DL (ref 70–99)
HCT VFR BLD CALC: 37.7 % (ref 36–48)
HEMOGLOBIN: 12.6 G/DL (ref 12–16)
LYMPHOCYTES ABSOLUTE: 2.3 K/UL (ref 1–5.1)
LYMPHOCYTES RELATIVE PERCENT: 32 %
MCH RBC QN AUTO: 28 PG (ref 26–34)
MCHC RBC AUTO-ENTMCNC: 33.4 G/DL (ref 31–36)
MCV RBC AUTO: 83.9 FL (ref 80–100)
MONOCYTES ABSOLUTE: 0.5 K/UL (ref 0–1.3)
MONOCYTES RELATIVE PERCENT: 7.6 %
NEUTROPHILS ABSOLUTE: 4.2 K/UL (ref 1.7–7.7)
NEUTROPHILS RELATIVE PERCENT: 58.1 %
PDW BLD-RTO: 14.2 % (ref 12.4–15.4)
PLATELET # BLD: 250 K/UL (ref 135–450)
PMV BLD AUTO: 7.8 FL (ref 5–10.5)
POTASSIUM REFLEX MAGNESIUM: 4.2 MMOL/L (ref 3.5–5.1)
PRO-BNP: 84 PG/ML (ref 0–124)
RBC # BLD: 4.49 M/UL (ref 4–5.2)
SODIUM BLD-SCNC: 139 MMOL/L (ref 136–145)
TOTAL PROTEIN: 7.4 G/DL (ref 6.4–8.2)
TROPONIN: <0.01 NG/ML
WBC # BLD: 7.2 K/UL (ref 4–11)

## 2022-08-10 PROCEDURE — 71046 X-RAY EXAM CHEST 2 VIEWS: CPT

## 2022-08-10 PROCEDURE — 93005 ELECTROCARDIOGRAM TRACING: CPT | Performed by: EMERGENCY MEDICINE

## 2022-08-10 PROCEDURE — 85379 FIBRIN DEGRADATION QUANT: CPT

## 2022-08-10 PROCEDURE — 84484 ASSAY OF TROPONIN QUANT: CPT

## 2022-08-10 PROCEDURE — 6360000004 HC RX CONTRAST MEDICATION: Performed by: EMERGENCY MEDICINE

## 2022-08-10 PROCEDURE — 83880 ASSAY OF NATRIURETIC PEPTIDE: CPT

## 2022-08-10 PROCEDURE — 80053 COMPREHEN METABOLIC PANEL: CPT

## 2022-08-10 PROCEDURE — 99285 EMERGENCY DEPT VISIT HI MDM: CPT

## 2022-08-10 PROCEDURE — 85025 COMPLETE CBC W/AUTO DIFF WBC: CPT

## 2022-08-10 PROCEDURE — 71260 CT THORAX DX C+: CPT | Performed by: EMERGENCY MEDICINE

## 2022-08-10 PROCEDURE — 6370000000 HC RX 637 (ALT 250 FOR IP): Performed by: EMERGENCY MEDICINE

## 2022-08-10 RX ADMIN — IBUPROFEN 600 MG: 200 TABLET, FILM COATED ORAL at 16:45

## 2022-08-10 RX ADMIN — IOPAMIDOL 75 ML: 755 INJECTION, SOLUTION INTRAVENOUS at 17:58

## 2022-08-10 ASSESSMENT — PAIN - FUNCTIONAL ASSESSMENT
PAIN_FUNCTIONAL_ASSESSMENT: NONE - DENIES PAIN
PAIN_FUNCTIONAL_ASSESSMENT: PREVENTS OR INTERFERES SOME ACTIVE ACTIVITIES AND ADLS
PAIN_FUNCTIONAL_ASSESSMENT: 0-10

## 2022-08-10 ASSESSMENT — PAIN DESCRIPTION - PAIN TYPE: TYPE: ACUTE PAIN

## 2022-08-10 ASSESSMENT — PAIN DESCRIPTION - ORIENTATION
ORIENTATION: LEFT
ORIENTATION: LEFT

## 2022-08-10 ASSESSMENT — PAIN SCALES - GENERAL
PAINLEVEL_OUTOF10: 10
PAINLEVEL_OUTOF10: 8
PAINLEVEL_OUTOF10: 2
PAINLEVEL_OUTOF10: 6
PAINLEVEL_OUTOF10: 2

## 2022-08-10 ASSESSMENT — HEART SCORE: ECG: 0

## 2022-08-10 ASSESSMENT — PAIN DESCRIPTION - FREQUENCY: FREQUENCY: CONTINUOUS

## 2022-08-10 ASSESSMENT — PAIN DESCRIPTION - LOCATION
LOCATION: RIB CAGE
LOCATION: RIB CAGE
LOCATION: BREAST;RIB CAGE

## 2022-08-10 ASSESSMENT — PAIN DESCRIPTION - DESCRIPTORS
DESCRIPTORS: THROBBING
DESCRIPTORS: ACHING

## 2022-08-10 ASSESSMENT — PAIN DESCRIPTION - ONSET: ONSET: ON-GOING

## 2022-08-10 NOTE — ED PROVIDER NOTES
[facility] EMERGENCY DEPARTMENT    CHIEF COMPLAINT  Rib Pain (Left sided pain under breast for two days, no known injury. States had a similar pain last year and was told it was pneumonia.)       HISTORY OF PRESENT ILLNESS  Winter Warner is a 59 y.o. female who presents to the ED with complaint of left sided chest and back pain. The pain comes and goes. Started two days ago. Sometimes worse in particular positions or with breathing. Quality is poorly characterized. Denies fever, cough, congestion. Complains of SOB. Denies nausea, vomiting, diarrhea, abdominal pain. No other complaints, modifying factors or associated symptoms.      I have reviewed the following from the nursing documentation:    Past Medical History:   Diagnosis Date    Abnormal Pap smear     ASCUS (atypical squamous cells of undetermined significance) on gynecologic Papanicolaou smear complicating pregnancy, antepartum 5/2015    Endometrial thickening on ultra sound 10/2015    GERD (gastroesophageal reflux disease)     HIGH CHOLESTEROL     Hypertension     Migraines     Moderate dysplasia of cervix 8/2013     Past Surgical History:   Procedure Laterality Date    BREAST SURGERY  2003    left     COLONOSCOPY N/A 3/19/2019    COLONOSCOPY POLYPECTOMY SNARE/COLD BIOPSY performed by Lia Amaral MD at [address] N/A 3/19/2019    COLONOSCOPY WITH BIOPSY performed by Lia Amaral MD at [address]  5/5/2013    ENDOMETRIAL BIOPSY      HEMORROIDECTOMY N/A 4/18/2019    COLORECTAL EXAM UNDER ANESTHESIA WITH RECTAL POLYPECTOMY performed by Refugio Thompson MD at [address] [facility]       Family History   Problem Relation Age of Onset    Rheum Arthritis Neg Hx     Osteoarthritis Neg Hx     Asthma Neg Hx     Breast Cancer Neg Hx     Cancer Neg Hx     Diabetes Neg Hx     Heart Failure Neg Hx     High Cholesterol Neg Hx     Hypertension Neg Hx     Migraines Neg Hx     Ovarian Cancer Neg EOMI.  Heart/Chest: RRR. No murmurs. Lungs: Respirations unlabored. CTAB. Good air exchange. Speaking comfortably in full sentences. Abdomen: Soft. Non-tender. Non-distended. No rebound or guarding. Musculoskeletal: No extremity edema. No deformity. No tenderness in the extremities. All extremities neurovascularly intact. Skin: Warm and dry. No acute rashes. Neurological: Alert and oriented. CN II-XII intact. Strength 5/5 bilateral upper and lower extremities. Sensation intact to light touch. Gait normal.  Psychiatric: Mood/affect: normal      LABS  I have reviewed all labs for this visit.    Results for orders placed or performed during the hospital encounter of 08/10/22   CBC with Auto Differential   Result Value Ref Range    WBC 7.2 4.0 - 11.0 K/uL    RBC 4.49 4.00 - 5.20 M/uL    Hemoglobin 12.6 12.0 - 16.0 g/dL    Hematocrit 37.7 36.0 - 48.0 %    MCV 83.9 80.0 - 100.0 fL    MCH 28.0 26.0 - 34.0 pg    MCHC 33.4 31.0 - 36.0 g/dL    RDW 14.2 12.4 - 15.4 %    Platelets 819 174 - 226 K/uL    MPV 7.8 5.0 - 10.5 fL    Neutrophils % 58.1 %    Lymphocytes % 32.0 %    Monocytes % 7.6 %    Eosinophils % 1.4 %    Basophils % 0.9 %    Neutrophils Absolute 4.2 1.7 - 7.7 K/uL    Lymphocytes Absolute 2.3 1.0 - 5.1 K/uL    Monocytes Absolute 0.5 0.0 - 1.3 K/uL    Eosinophils Absolute 0.1 0.0 - 0.6 K/uL    Basophils Absolute 0.1 0.0 - 0.2 K/uL   Comprehensive Metabolic Panel w/ Reflex to MG   Result Value Ref Range    Sodium 139 136 - 145 mmol/L    Potassium reflex Magnesium 4.2 3.5 - 5.1 mmol/L    Chloride 103 99 - 110 mmol/L    CO2 25 21 - 32 mmol/L    Anion Gap 11 3 - 16    Glucose 87 70 - 99 mg/dL    BUN 12 7 - 20 mg/dL    Creatinine 0.6 0.6 - 1.1 mg/dL    GFR Non-African American >60 >60    GFR African American >60 >60    Calcium 10.2 8.3 - 10.6 mg/dL    Total Protein 7.4 6.4 - 8.2 g/dL    Albumin 3.8 3.4 - 5.0 g/dL    Albumin/Globulin Ratio 1.1 1.1 - 2.2    Total Bilirubin 0.4 0.0 - 1.0 mg/dL    Alkaline Phosphatase 94 40 - 129 U/L    ALT 8 (L) 10 - 40 U/L    AST 13 (L) 15 - 37 U/L   Troponin   Result Value Ref Range    Troponin <0.01 <0.01 ng/mL   Brain Natriuretic Peptide   Result Value Ref Range    Pro-BNP 84 0 - 124 pg/mL   D-Dimer, Quantitative   Result Value Ref Range    D-Dimer, Quant 1.02 (H) 0.00 - 0.60 ug/mL FEU       RADIOLOGY  I have reviewed all radiographic studies for this visit. CT CHEST PULMONARY EMBOLISM W CONTRAST   Final Result   No evidence of pulmonary embolism or acute pulmonary abnormality. XR CHEST (2 VW)   Final Result   No radiographic evidence of acute pulmonary disease. ECG  EKG interpreted by myself. Rate: 58  Rhythm: sinus bradycardia with sinus arrhythmia   Axis: normal  Intervals: within normal limits  ST Segments: no acute abnormality  T waves: no acute abnormality  Comparison: Compared to 4/15/21, rate slower by 19 bpm  Impression: sinus bradycardia with sinus arrhythmia     ED COURSE/MDM  Patient seen and evaluated. Old records reviewed. Labs and imaging reviewed and results discussed with patient/family to extent possible. Patient presents with chest pain. Atypical for cardiac chest pain. EKG no acute ischemic abnormality. Troponin within normal limits. There is a sharp component to the chest pain that is augmented with respiration. I obtained a D-dimer that was elevated however CTPA showed no pulmonary embolism or other acute cardiopulmonary abnormality. Presentation not consistent with esophageal rupture as patient is nontoxic and no history of multiple episodes of forceful vomiting. Not consistent with pneumothorax. Not consistent with aortic dissection as pain not tearing, not sudden in onset, not migratory, pulses are symmetric, and there are no neurological deficits. As below, patient's HEART score calculates to low risk, and therefore supports early discharge with close PCP follow up.      HEART SCORE:    History: Slightly Suspicious  ECG: Normal  Patient Age: > 39 and < 65 years  Risk Factors: > 3 Risk factors or history of atherosclerotic disease*  Troponin: < 1X normal limit  Heart Score Total: 3     Is this patient to be included in the SEP-1 Core Measure? No   Exclusion criteria - the patient is NOT to be included for SEP-1 Core Measure due to: Infection is not suspected    I, Allen Morley MD, am the primary clinician of record. During the patient's ED course, the patient was given:  Medications   ibuprofen (ADVIL;MOTRIN) tablet 600 mg (600 mg Oral Given 8/10/22 1645)   iopamidol (ISOVUE-370) 76 % injection 75 mL (75 mLs IntraVENous Given 8/10/22 8657)        All questions were answered and the patient/family expressed understanding and agreement with the plan. PROCEDURES  None    CRITICAL CARE  N/A    CLINICAL IMPRESSION  1. Chest pain, unspecified type        DISPOSITION   discharge     Allen Morley MD    Note: This chart was created using voice recognition dictation software. Efforts were made by me to ensure accuracy, however some errors may be present due to limitations of this technology and occasionally words are not transcribed correctly.         Allen Morley MD  08/10/22 4591

## 2022-08-10 NOTE — ED NOTES
Pt ambulated to and from restroom without difficulty with slow steady gait. No complications noted or reported.       Sruthi Amos RN  08/10/22 1640

## 2022-08-10 NOTE — ED NOTES
Pt transferred to ED rm 33 at this time. Pt to ED with c/o pain under her left breast that radiates to her back, states symptoms started 2 days ago. No known injury. Pt states that she had similar pain last year when she was dx with pneumonia.         Santy Viera RN  08/10/22 1334

## 2022-08-10 NOTE — ED NOTES
Report given to receiving JIGNA Nair, all questions answered.       Samantha Prieto RN  08/10/22 2442

## 2022-08-11 LAB
EKG ATRIAL RATE: 58 BPM
EKG DIAGNOSIS: NORMAL
EKG P AXIS: 69 DEGREES
EKG P-R INTERVAL: 154 MS
EKG Q-T INTERVAL: 430 MS
EKG QRS DURATION: 98 MS
EKG QTC CALCULATION (BAZETT): 422 MS
EKG R AXIS: 22 DEGREES
EKG T AXIS: 30 DEGREES
EKG VENTRICULAR RATE: 58 BPM

## 2022-08-11 PROCEDURE — 93010 ELECTROCARDIOGRAM REPORT: CPT | Performed by: INTERNAL MEDICINE

## 2022-12-19 ENCOUNTER — OFFICE VISIT (OUTPATIENT)
Dept: GYNECOLOGY | Age: 57
End: 2022-12-19
Payer: MEDICAID

## 2022-12-19 VITALS
HEART RATE: 65 BPM | OXYGEN SATURATION: 100 % | RESPIRATION RATE: 17 BRPM | DIASTOLIC BLOOD PRESSURE: 70 MMHG | SYSTOLIC BLOOD PRESSURE: 131 MMHG | WEIGHT: 214.4 LBS | BODY MASS INDEX: 34.61 KG/M2

## 2022-12-19 DIAGNOSIS — Z78.0 MENOPAUSE: ICD-10-CM

## 2022-12-19 DIAGNOSIS — E04.9 ENLARGED THYROID: ICD-10-CM

## 2022-12-19 DIAGNOSIS — D21.9 FIBROIDS: ICD-10-CM

## 2022-12-19 DIAGNOSIS — Z01.419 WELL WOMAN EXAM WITH ROUTINE GYNECOLOGICAL EXAM: Primary | ICD-10-CM

## 2022-12-19 PROCEDURE — G8484 FLU IMMUNIZE NO ADMIN: HCPCS | Performed by: OBSTETRICS & GYNECOLOGY

## 2022-12-19 PROCEDURE — 99396 PREV VISIT EST AGE 40-64: CPT | Performed by: OBSTETRICS & GYNECOLOGY

## 2022-12-19 ASSESSMENT — ENCOUNTER SYMPTOMS
ALLERGIC/IMMUNOLOGIC NEGATIVE: 1
EYES NEGATIVE: 1
RESPIRATORY NEGATIVE: 1
GASTROINTESTINAL NEGATIVE: 1

## 2022-12-20 ENCOUNTER — HOSPITAL ENCOUNTER (OUTPATIENT)
Dept: ULTRASOUND IMAGING | Age: 57
Discharge: HOME | End: 2022-12-20
Payer: MEDICAID

## 2022-12-20 DIAGNOSIS — D21.9 FIBROIDS: ICD-10-CM

## 2022-12-20 DIAGNOSIS — E04.9 ENLARGED THYROID: ICD-10-CM

## 2022-12-20 PROCEDURE — 76830 TRANSVAGINAL US NON-OB: CPT

## 2022-12-20 PROCEDURE — 76856 US EXAM PELVIC COMPLETE: CPT

## 2022-12-20 PROCEDURE — 76536 US EXAM OF HEAD AND NECK: CPT

## 2022-12-20 NOTE — PROGRESS NOTES
Subjective:      Patient ID: Akhil Levy is a 59 y.o. female. Patient is here for annual. Patient in menopause. Gynecologic Exam      Review of Systems   Constitutional: Negative. HENT: Negative. Eyes: Negative. Respiratory: Negative. Cardiovascular: Negative. Gastrointestinal: Negative. Endocrine: Negative. Genitourinary: Negative. Musculoskeletal: Negative. Skin: Negative. Allergic/Immunologic: Negative. Neurological: Negative. Hematological: Negative. Psychiatric/Behavioral: Negative.        Date of Birth 1965  Past Medical History:   Diagnosis Date    Abnormal Pap smear     ASCUS (atypical squamous cells of undetermined significance) on gynecologic Papanicolaou smear complicating pregnancy, antepartum 2015    Endometrial thickening on ultra sound 10/2015    GERD (gastroesophageal reflux disease)     HIGH CHOLESTEROL     Hypertension     Migraines     Moderate dysplasia of cervix 2013     Past Surgical History:   Procedure Laterality Date    BREAST SURGERY      left     COLONOSCOPY N/A 3/19/2019    COLONOSCOPY POLYPECTOMY SNARE/COLD BIOPSY performed by Marlee Higuera MD at [address] [place] [address] N/A 3/19/2019    COLONOSCOPY WITH BIOPSY performed by Marlee Higuera MD at [address] [place] [address]  2013    ENDOMETRIAL BIOPSY      HEMORROIDECTOMY N/A 2019    COLORECTAL EXAM UNDER ANESTHESIA WITH RECTAL POLYPECTOMY performed by Praveen Johnson MD at [place] [address] History    Para Term  AB Living   5 3 3   2 3   SAB IAB Ectopic Molar Multiple Live Births   2         3      # Outcome Date GA Lbr Fuentes/2nd Weight Sex Delivery Anes PTL Lv   5 Term  40w0d   M    CATHRYN   4 SAB            3 Term  40w0d   M Vag-Spont   CATHRYN   2 SAB            1 Term  37w0d   F Vag-Spont   CATHRYN     Social History     Socioeconomic History    Marital status:      Spouse name: Not on file    Number of children: Not on file    Years of education: Not on file    Highest education level: Not on file   Occupational History    Not on file   Tobacco Use    Smoking status: Never    Smokeless tobacco: Never   Vaping Use    Vaping Use: Never used   Substance and Sexual Activity    Alcohol use: No     Alcohol/week: 0.0 standard drinks    Drug use: No    Sexual activity: Yes     Partners: Male   Other Topics Concern    Not on file   Social History Narrative    Not on file     Social Determinants of Health     Financial Resource Strain: Not on file   Food Insecurity: Not on file   Transportation Needs: Not on file   Physical Activity: Not on file   Stress: Not on file   Social Connections: Not on file   Intimate Partner Violence: Not on file   Housing Stability: Not on file     Allergies   Allergen Reactions    Aspirin Nausea Only     No outpatient medications have been marked as taking for the 12/19/22 encounter (Office Visit) with Marybeth Hargrove MD.     Family History   Problem Relation Age of Onset    Rheum Arthritis Neg Hx     Osteoarthritis Neg Hx     Asthma Neg Hx     Breast Cancer Neg Hx     Cancer Neg Hx     Diabetes Neg Hx     Heart Failure Neg Hx     High Cholesterol Neg Hx     Hypertension Neg Hx     Migraines Neg Hx     Ovarian Cancer Neg Hx     Rashes/Skin Problems Neg Hx     Seizures Neg Hx     Stroke Neg Hx     Thyroid Disease Neg Hx      /70 (Site: Right Upper Arm, Position: Sitting, Cuff Size: Large Adult)   Pulse 65   Resp 17   Wt 214 lb 6.4 oz (97.3 kg)   LMP 06/06/2013   SpO2 100%   BMI 34.61 kg/m²       Objective:   Physical Exam  Constitutional:       General: She is not in acute distress. Appearance: Normal appearance. She is well-developed and normal weight. She is not diaphoretic. HENT:      Head: Normocephalic and atraumatic. Nose: Nose normal.      Mouth/Throat:      Mouth: Mucous membranes are moist.      Pharynx: Oropharynx is clear.    Eyes:      Extraocular Movements: Extraocular movements intact. Neck:      Thyroid: Thyromegaly present. Cardiovascular:      Rate and Rhythm: Normal rate and regular rhythm. Heart sounds: Normal heart sounds. No murmur heard. No friction rub. No gallop. Pulmonary:      Effort: Pulmonary effort is normal. No respiratory distress. Breath sounds: Normal breath sounds. No wheezing or rales. Chest:   Breasts:     Right: Normal. No swelling, bleeding, inverted nipple, mass, nipple discharge, skin change or tenderness. Left: Normal. No swelling, bleeding, inverted nipple, mass, nipple discharge, skin change or tenderness. Abdominal:      General: Abdomen is flat. Bowel sounds are normal. There is no distension. Palpations: Abdomen is soft. There is no hepatomegaly or mass. Tenderness: There is no abdominal tenderness. There is no guarding or rebound. Hernia: No hernia is present. There is no hernia in the left inguinal area or right inguinal area. Genitourinary:     General: Normal vulva. Exam position: Lithotomy position. Pubic Area: No rash. Labia:         Right: No rash, tenderness, lesion or injury. Left: No rash, tenderness, lesion or injury. Urethra: No prolapse, urethral pain, urethral swelling or urethral lesion. Vagina: Normal. No signs of injury and foreign body. No vaginal discharge, erythema, tenderness or bleeding. Cervix: No cervical motion tenderness, discharge, friability, lesion, erythema, cervical bleeding or eversion. Uterus: Enlarged. Not deviated, not fixed, not tender and no uterine prolapse. Adnexa:         Right: No mass, tenderness or fullness. Left: No mass, tenderness or fullness. Rectum: Normal. Guaiac result negative. No mass, tenderness, anal fissure, external hemorrhoid or internal hemorrhoid. Normal anal tone. Comments: Normal urethral meatus, nl urethra, nl bladder.     Musculoskeletal: General: No swelling, tenderness, deformity or signs of injury. Normal range of motion. Cervical back: Normal range of motion and neck supple. No rigidity. Lymphadenopathy:      Cervical: No cervical adenopathy. Lower Body: No right inguinal adenopathy. No left inguinal adenopathy. Skin:     General: Skin is warm and dry. Coloration: Skin is not jaundiced or pale. Findings: No bruising, erythema, lesion or rash. Neurological:      General: No focal deficit present. Mental Status: She is alert and oriented to person, place, and time. Deep Tendon Reflexes: Reflexes are normal and symmetric. Psychiatric:         Mood and Affect: Mood normal.         Behavior: Behavior normal.         Thought Content: Thought content normal.         Judgment: Judgment normal.       Assessment:      Annual  Menopause  Enlarged thyroid  Enlarged fibroid uterus/has occasional pain      Plan:      Pap, calcium, exercise, mammogram, hemocult negative  Stable  Thyroid US  Repeat US to confirm stability of size.         Jackelyn Ortiz MD

## 2023-07-14 ENCOUNTER — HOSPITAL ENCOUNTER (OUTPATIENT)
Dept: WOMENS IMAGING | Age: 58
Discharge: HOME | End: 2023-07-14
Payer: MEDICAID

## 2023-07-14 DIAGNOSIS — Z12.31 BREAST CANCER SCREENING BY MAMMOGRAM: ICD-10-CM

## 2023-07-14 PROCEDURE — 77067 SCR MAMMO BI INCL CAD: CPT

## 2023-07-25 ENCOUNTER — APPOINTMENT (OUTPATIENT)
Dept: CT IMAGING | Age: 58
End: 2023-07-25
Payer: MEDICAID

## 2023-07-25 ENCOUNTER — HOSPITAL ENCOUNTER (OUTPATIENT)
Age: 58
Setting detail: OBSERVATION
Discharge: HOME | End: 2023-07-27
Attending: EMERGENCY MEDICINE | Admitting: INTERNAL MEDICINE
Payer: MEDICAID

## 2023-07-25 ENCOUNTER — APPOINTMENT (OUTPATIENT)
Dept: GENERAL RADIOLOGY | Age: 58
End: 2023-07-25
Payer: MEDICAID

## 2023-07-25 DIAGNOSIS — R94.31 NONSPECIFIC ST-T WAVE ELECTROCARDIOGRAPHIC CHANGES: ICD-10-CM

## 2023-07-25 DIAGNOSIS — Z71.89 GOALS OF CARE, COUNSELING/DISCUSSION: ICD-10-CM

## 2023-07-25 DIAGNOSIS — R07.9 CHEST PAIN, UNSPECIFIED TYPE: Primary | ICD-10-CM

## 2023-07-25 LAB
ALBUMIN SERPL-MCNC: 3.9 G/DL (ref 3.4–5)
ALBUMIN/GLOB SERPL: 1.1 {RATIO} (ref 1.1–2.2)
ALP SERPL-CCNC: 100 U/L (ref 40–129)
ALT SERPL-CCNC: 7 U/L (ref 10–40)
ANION GAP SERPL CALCULATED.3IONS-SCNC: 9 MMOL/L (ref 3–16)
AST SERPL-CCNC: 13 U/L (ref 15–37)
BASOPHILS # BLD: 0 K/UL (ref 0–0.2)
BASOPHILS NFR BLD: 0.7 %
BILIRUB SERPL-MCNC: 0.3 MG/DL (ref 0–1)
BUN SERPL-MCNC: 10 MG/DL (ref 7–20)
CALCIUM SERPL-MCNC: 9.8 MG/DL (ref 8.3–10.6)
CHLORIDE SERPL-SCNC: 105 MMOL/L (ref 99–110)
CO2 SERPL-SCNC: 25 MMOL/L (ref 21–32)
CREAT SERPL-MCNC: 0.6 MG/DL (ref 0.6–1.2)
D DIMER: 0.99 UG/ML FEU (ref 0–0.6)
DEPRECATED RDW RBC AUTO: 13.6 % (ref 12.4–15.4)
EKG ATRIAL RATE: 65 BPM
EKG DIAGNOSIS: NORMAL
EKG P AXIS: 70 DEGREES
EKG P-R INTERVAL: 152 MS
EKG Q-T INTERVAL: 412 MS
EKG QRS DURATION: 88 MS
EKG QTC CALCULATION (BAZETT): 428 MS
EKG R AXIS: 11 DEGREES
EKG T AXIS: 9 DEGREES
EKG VENTRICULAR RATE: 65 BPM
EOSINOPHIL # BLD: 0.1 K/UL (ref 0–0.6)
EOSINOPHIL NFR BLD: 1.6 %
GFR SERPLBLD CREATININE-BSD FMLA CKD-EPI: >60 ML/MIN/{1.73_M2}
GLUCOSE SERPL-MCNC: 127 MG/DL (ref 70–99)
HCT VFR BLD AUTO: 38 % (ref 36–48)
HGB BLD-MCNC: 12.7 G/DL (ref 12–16)
LYMPHOCYTES # BLD: 2 K/UL (ref 1–5.1)
LYMPHOCYTES NFR BLD: 31.1 %
MCH RBC QN AUTO: 28 PG (ref 26–34)
MCHC RBC AUTO-ENTMCNC: 33.4 G/DL (ref 31–36)
MCV RBC AUTO: 84 FL (ref 80–100)
MONOCYTES # BLD: 0.7 K/UL (ref 0–1.3)
MONOCYTES NFR BLD: 10.3 %
NEUTROPHILS # BLD: 3.5 K/UL (ref 1.7–7.7)
NEUTROPHILS NFR BLD: 56.3 %
NT-PROBNP SERPL-MCNC: <36 PG/ML (ref 0–124)
PLATELET # BLD AUTO: 256 K/UL (ref 135–450)
PMV BLD AUTO: 8.7 FL (ref 5–10.5)
POTASSIUM SERPL-SCNC: 3.6 MMOL/L (ref 3.5–5.1)
PROT SERPL-MCNC: 7.4 G/DL (ref 6.4–8.2)
RBC # BLD AUTO: 4.53 M/UL (ref 4–5.2)
SODIUM SERPL-SCNC: 139 MMOL/L (ref 136–145)
TROPONIN, HIGH SENSITIVITY: 7 NG/L (ref 0–14)
TROPONIN, HIGH SENSITIVITY: <6 NG/L (ref 0–14)
WBC # BLD AUTO: 6.3 K/UL (ref 4–11)

## 2023-07-25 PROCEDURE — 83880 ASSAY OF NATRIURETIC PEPTIDE: CPT

## 2023-07-25 PROCEDURE — 93010 ELECTROCARDIOGRAM REPORT: CPT | Performed by: INTERNAL MEDICINE

## 2023-07-25 PROCEDURE — 93005 ELECTROCARDIOGRAM TRACING: CPT

## 2023-07-25 PROCEDURE — 6370000000 HC RX 637 (ALT 250 FOR IP): Performed by: INTERNAL MEDICINE

## 2023-07-25 PROCEDURE — 80053 COMPREHEN METABOLIC PANEL: CPT

## 2023-07-25 PROCEDURE — 2580000003 HC RX 258: Performed by: INTERNAL MEDICINE

## 2023-07-25 PROCEDURE — 99285 EMERGENCY DEPT VISIT HI MDM: CPT

## 2023-07-25 PROCEDURE — 71046 X-RAY EXAM CHEST 2 VIEWS: CPT

## 2023-07-25 PROCEDURE — G0378 HOSPITAL OBSERVATION PER HR: HCPCS

## 2023-07-25 PROCEDURE — 85379 FIBRIN DEGRADATION QUANT: CPT

## 2023-07-25 PROCEDURE — 96372 THER/PROPH/DIAG INJ SC/IM: CPT

## 2023-07-25 PROCEDURE — 84484 ASSAY OF TROPONIN QUANT: CPT

## 2023-07-25 PROCEDURE — 6360000002 HC RX W HCPCS: Performed by: INTERNAL MEDICINE

## 2023-07-25 PROCEDURE — 85025 COMPLETE CBC W/AUTO DIFF WBC: CPT

## 2023-07-25 PROCEDURE — 36415 COLL VENOUS BLD VENIPUNCTURE: CPT

## 2023-07-25 PROCEDURE — 96374 THER/PROPH/DIAG INJ IV PUSH: CPT

## 2023-07-25 PROCEDURE — 6370000000 HC RX 637 (ALT 250 FOR IP): Performed by: NURSE PRACTITIONER

## 2023-07-25 PROCEDURE — 71260 CT THORAX DX C+: CPT

## 2023-07-25 PROCEDURE — 6360000004 HC RX CONTRAST MEDICATION: Performed by: NURSE PRACTITIONER

## 2023-07-25 RX ORDER — TRIAMTERENE AND HYDROCHLOROTHIAZIDE 37.5; 25 MG/1; MG/1
1 TABLET ORAL
Status: DISCONTINUED | OUTPATIENT
Start: 2023-07-25 | End: 2023-07-27 | Stop reason: HOSPADM

## 2023-07-25 RX ORDER — IBUPROFEN 600 MG/1
600 TABLET ORAL EVERY 6 HOURS PRN
COMMUNITY

## 2023-07-25 RX ORDER — OMEPRAZOLE 20 MG/1
20 CAPSULE, DELAYED RELEASE ORAL DAILY
COMMUNITY

## 2023-07-25 RX ORDER — ATORVASTATIN CALCIUM 40 MG/1
40 TABLET, FILM COATED ORAL NIGHTLY
Status: DISCONTINUED | OUTPATIENT
Start: 2023-07-25 | End: 2023-07-27 | Stop reason: HOSPADM

## 2023-07-25 RX ORDER — ONDANSETRON 4 MG/1
4 TABLET, ORALLY DISINTEGRATING ORAL EVERY 8 HOURS PRN
Status: DISCONTINUED | OUTPATIENT
Start: 2023-07-25 | End: 2023-07-27 | Stop reason: HOSPADM

## 2023-07-25 RX ORDER — ENOXAPARIN SODIUM 100 MG/ML
40 INJECTION SUBCUTANEOUS NIGHTLY
Status: DISCONTINUED | OUTPATIENT
Start: 2023-07-25 | End: 2023-07-27 | Stop reason: HOSPADM

## 2023-07-25 RX ORDER — ACETAMINOPHEN 500 MG
1000 TABLET ORAL ONCE
Status: COMPLETED | OUTPATIENT
Start: 2023-07-25 | End: 2023-07-25

## 2023-07-25 RX ORDER — REGADENOSON 0.08 MG/ML
0.4 INJECTION, SOLUTION INTRAVENOUS
Status: COMPLETED | OUTPATIENT
Start: 2023-07-25 | End: 2023-07-26

## 2023-07-25 RX ORDER — TRIAMTERENE AND HYDROCHLOROTHIAZIDE 37.5; 25 MG/1; MG/1
1 TABLET ORAL DAILY
COMMUNITY

## 2023-07-25 RX ORDER — ASPIRIN 81 MG/1
324 TABLET, CHEWABLE ORAL ONCE
Status: COMPLETED | OUTPATIENT
Start: 2023-07-25 | End: 2023-07-25

## 2023-07-25 RX ORDER — SODIUM CHLORIDE 0.9 % (FLUSH) 0.9 %
5-40 SYRINGE (ML) INJECTION PRN
Status: DISCONTINUED | OUTPATIENT
Start: 2023-07-25 | End: 2023-07-26 | Stop reason: SDUPTHER

## 2023-07-25 RX ORDER — BUTALBITAL, ACETAMINOPHEN AND CAFFEINE 50; 325; 40 MG/1; MG/1; MG/1
1 TABLET ORAL EVERY 6 HOURS PRN
Status: DISCONTINUED | OUTPATIENT
Start: 2023-07-25 | End: 2023-07-27 | Stop reason: HOSPADM

## 2023-07-25 RX ORDER — ACETAMINOPHEN 650 MG/1
650 SUPPOSITORY RECTAL EVERY 6 HOURS PRN
Status: DISCONTINUED | OUTPATIENT
Start: 2023-07-25 | End: 2023-07-27 | Stop reason: HOSPADM

## 2023-07-25 RX ORDER — NITROGLYCERIN 0.4 MG/1
0.4 TABLET SUBLINGUAL EVERY 5 MIN PRN
Status: DISCONTINUED | OUTPATIENT
Start: 2023-07-25 | End: 2023-07-27 | Stop reason: HOSPADM

## 2023-07-25 RX ORDER — ASPIRIN 81 MG/1
81 TABLET, CHEWABLE ORAL DAILY
Status: DISCONTINUED | OUTPATIENT
Start: 2023-07-26 | End: 2023-07-27 | Stop reason: HOSPADM

## 2023-07-25 RX ORDER — SODIUM CHLORIDE 0.9 % (FLUSH) 0.9 %
5-40 SYRINGE (ML) INJECTION EVERY 12 HOURS SCHEDULED
Status: DISCONTINUED | OUTPATIENT
Start: 2023-07-25 | End: 2023-07-26 | Stop reason: SDUPTHER

## 2023-07-25 RX ORDER — MORPHINE SULFATE 4 MG/ML
4 INJECTION, SOLUTION INTRAMUSCULAR; INTRAVENOUS ONCE
Status: COMPLETED | OUTPATIENT
Start: 2023-07-25 | End: 2023-07-25

## 2023-07-25 RX ORDER — ONDANSETRON 2 MG/ML
4 INJECTION INTRAMUSCULAR; INTRAVENOUS EVERY 6 HOURS PRN
Status: DISCONTINUED | OUTPATIENT
Start: 2023-07-25 | End: 2023-07-27 | Stop reason: HOSPADM

## 2023-07-25 RX ORDER — SIMVASTATIN 40 MG
40 TABLET ORAL NIGHTLY
COMMUNITY

## 2023-07-25 RX ORDER — LIDOCAINE 4 G/G
1 PATCH TOPICAL ONCE
Status: COMPLETED | OUTPATIENT
Start: 2023-07-25 | End: 2023-07-26

## 2023-07-25 RX ORDER — ACETAMINOPHEN 325 MG/1
650 TABLET ORAL EVERY 6 HOURS PRN
Status: DISCONTINUED | OUTPATIENT
Start: 2023-07-25 | End: 2023-07-27 | Stop reason: HOSPADM

## 2023-07-25 RX ORDER — FAMOTIDINE 20 MG/1
20 TABLET, FILM COATED ORAL 2 TIMES DAILY
Status: DISCONTINUED | OUTPATIENT
Start: 2023-07-25 | End: 2023-07-26

## 2023-07-25 RX ORDER — POLYETHYLENE GLYCOL 3350 17 G/17G
17 POWDER, FOR SOLUTION ORAL DAILY PRN
Status: DISCONTINUED | OUTPATIENT
Start: 2023-07-25 | End: 2023-07-27 | Stop reason: HOSPADM

## 2023-07-25 RX ORDER — SODIUM CHLORIDE 9 MG/ML
INJECTION, SOLUTION INTRAVENOUS PRN
Status: DISCONTINUED | OUTPATIENT
Start: 2023-07-25 | End: 2023-07-26 | Stop reason: SDUPTHER

## 2023-07-25 RX ADMIN — ATORVASTATIN CALCIUM 40 MG: 40 TABLET, FILM COATED ORAL at 21:51

## 2023-07-25 RX ADMIN — FAMOTIDINE 20 MG: 20 TABLET, FILM COATED ORAL at 21:51

## 2023-07-25 RX ADMIN — IOPAMIDOL 75 ML: 755 INJECTION, SOLUTION INTRAVENOUS at 14:35

## 2023-07-25 RX ADMIN — Medication 20 ML: at 18:54

## 2023-07-25 RX ADMIN — ACETAMINOPHEN 1000 MG: 500 TABLET ORAL at 13:47

## 2023-07-25 RX ADMIN — Medication 10 ML: at 21:51

## 2023-07-25 RX ADMIN — TRIAMTERENE AND HYDROCHLOROTHIAZIDE 1 TABLET: 37.5; 25 TABLET ORAL at 18:48

## 2023-07-25 RX ADMIN — ASPIRIN 324 MG: 81 TABLET, CHEWABLE ORAL at 13:47

## 2023-07-25 RX ADMIN — ENOXAPARIN SODIUM 40 MG: 100 INJECTION SUBCUTANEOUS at 21:51

## 2023-07-25 RX ADMIN — MORPHINE SULFATE 4 MG: 4 INJECTION, SOLUTION INTRAMUSCULAR; INTRAVENOUS at 18:48

## 2023-07-25 ASSESSMENT — ENCOUNTER SYMPTOMS
VOMITING: 0
COUGH: 0
SORE THROAT: 0
BACK PAIN: 0
ABDOMINAL PAIN: 0
SHORTNESS OF BREATH: 1
EYE PAIN: 0
NAUSEA: 0
ANAL BLEEDING: 0

## 2023-07-25 ASSESSMENT — PAIN DESCRIPTION - LOCATION
LOCATION: CHEST
LOCATION: CHEST;BACK

## 2023-07-25 ASSESSMENT — PAIN DESCRIPTION - DESCRIPTORS
DESCRIPTORS: ACHING
DESCRIPTORS: SHARP

## 2023-07-25 ASSESSMENT — PAIN - FUNCTIONAL ASSESSMENT
PAIN_FUNCTIONAL_ASSESSMENT: 0-10
PAIN_FUNCTIONAL_ASSESSMENT: ACTIVITIES ARE NOT PREVENTED
PAIN_FUNCTIONAL_ASSESSMENT: PREVENTS OR INTERFERES SOME ACTIVE ACTIVITIES AND ADLS

## 2023-07-25 ASSESSMENT — PAIN DESCRIPTION - ORIENTATION
ORIENTATION: LEFT
ORIENTATION: LEFT;MID

## 2023-07-25 ASSESSMENT — HEART SCORE: ECG: 1

## 2023-07-25 ASSESSMENT — PAIN SCALES - GENERAL
PAINLEVEL_OUTOF10: 6
PAINLEVEL_OUTOF10: 8

## 2023-07-25 NOTE — ED NOTES
Pt to and from CT, repeat trop collected and sent to lab. Pt denies any needs at this time.       Fanny Barr RN  07/25/23 5424

## 2023-07-25 NOTE — PROGRESS NOTES
Pt admitted to 4259 - acclimated pt to the call light and room    PT AOX4 - pt denied n/v, sob, diarrhea, pt c/o 6/10 pain - pt had no further needs at this time - bed in lowest and locked position with call light and bedside table within reach - ARELI on

## 2023-07-25 NOTE — CARE COORDINATION
SW noting a likely to admit patient on the floor. We will arrive at bedside momentarily to assess. Respectfully submitted,    Renetta Hastings Nearing [facility], [facility]   [phone]    Electronically signed by OCTAVIA Grigsby, BRENDEN on 7/25/2023 at 1:20 PM

## 2023-07-25 NOTE — PROGRESS NOTES
Medication Reconciliation    List of medications patient is currently taking is complete. Source of information: 1. Conversation with patient at bedside                                       2. EPIC records      Allergies  Aspirin - NAUSEA     Notes regarding home medications:   1. Patient did not take any of her home medications prior to arrival to the ER today.     Kajal Guadalupe [facility], PharmD, BCPS  7/25/2023 4:43 PM

## 2023-07-25 NOTE — ED PROVIDER NOTES
[facility]  EMERGENCY DEPARTMENT ENCOUNTER      Pt Name: Matthew Fuentes  MRN: [id number]  [address] [place] 1965  Date of evaluation: 7/25/2023  Provider: Nir Vasquez  Chief Complaint   Patient presents with    Chest Pain     Mid sternal chest pain onset 2 days ago. Pt denies n/v.  Pt denies diaphoresis. I have fully participated in the care of Matthew Fuentes and have had a face-to-face evaluation. I have reviewed and agree with all pertinent clinical information, and midlevel provider's history, and physical exam. I have also reviewed the labs, EKG, and imaging studies and treatment plan. I have also reviewed and agree with the medications, allergies and past medical history section for this Fama Aniingue. I agree with the diagnosis, and I concur. This patient is at risk for a communicable infection. Therefore, personal protection equipment consisting of a mask was worn for the exam.    Past Medical History:   Diagnosis Date    Abnormal Pap smear     ASCUS (atypical squamous cells of undetermined significance) on gynecologic Papanicolaou smear complicating pregnancy, antepartum 5/2015    Endometrial thickening on ultra sound 10/2015    GERD (gastroesophageal reflux disease)     HIGH CHOLESTEROL     Hypertension     Migraines     Moderate dysplasia of cervix 8/2013       MDM:  Matthew Fuentes is a 59 y.o. female who presents with pleuritic type chest pain that started 2 days ago. She denies any fevers or chills. She denies a cough. No family members have been sick. She denies any new medications or medications over-the-counter that are new. She denies any nausea vomiting diarrhea. She describes it as moderate. She describes as a sharp sensation like a knife stabbing her and increases when she takes a deep breath. She states that has been constant for 2 days. Physical exam reveals patient to be comfortable.   Heart is regular rate and rhythm without murmurs clicks or Given 7/26/23 0850)   sodium chloride flush 0.9 % injection 5-40 mL (20 mLs IntraVENous Given 7/25/23 1854)   0.9 % sodium chloride infusion (has no administration in time range)   ondansetron (ZOFRAN-ODT) disintegrating tablet 4 mg (has no administration in time range)     Or   ondansetron (ZOFRAN) injection 4 mg (has no administration in time range)   acetaminophen (TYLENOL) tablet 650 mg (has no administration in time range)     Or   acetaminophen (TYLENOL) suppository 650 mg (has no administration in time range)   polyethylene glycol (GLYCOLAX) packet 17 g (has no administration in time range)   aspirin chewable tablet 81 mg (81 mg Oral Not Given 7/26/23 0850)   metoprolol tartrate (LOPRESSOR) tablet 12.5 mg (12.5 mg Oral Not Given 7/26/23 0850)   atorvastatin (LIPITOR) tablet 40 mg (40 mg Oral Given 7/25/23 2151)   nitroGLYCERIN (NITROSTAT) SL tablet 0.4 mg (has no administration in time range)   enoxaparin (LOVENOX) injection 40 mg (40 mg SubCUTAneous Given 7/25/23 2151)   perflutren lipid microspheres (DEFINITY) injection 1.5 mL (has no administration in time range)   pantoprazole (PROTONIX) tablet 40 mg (has no administration in time range)   aspirin chewable tablet 324 mg (324 mg Oral Given 7/25/23 1347)   lidocaine 4 % external patch 1 patch (1 patch TransDERmal Patch Removed 7/26/23 0200)   acetaminophen (TYLENOL) tablet 1,000 mg (1,000 mg Oral Given 7/25/23 1347)   iopamidol (ISOVUE-370) 76 % injection 75 mL (75 mLs IntraVENous Given 7/25/23 1435)   regadenoson (LEXISCAN) injection 0.4 mg (0.4 mg IntraVENous Given 7/26/23 0858)   morphine (PF) injection 4 mg (4 mg IntraVENous Given 7/25/23 1848)   technetium tetrofosmin (Tc-MYOVIEW) injection 10 millicurie (10 millicuries IntraVENous Given 7/26/23 0735)   technetium tetrofosmin (Tc-MYOVIEW) injection 30 millicurie (30 millicuries IntraVENous Given 7/26/23 0900)       Current Discharge Medication List          The patient's blood pressure was found to be

## 2023-07-25 NOTE — ED PROVIDER NOTES
[address] [facility] [address] [address]        Pt Name: Marie Chen  MRN: [id number]  [address] [facility] 1965  Date of evaluation: 7/25/2023  Provider: ALEXANDRIA Snyder - CNP  PCP: Cristal García MD  Note Started: 5:17 PM EDT 7/25/23      I have seen and evaluated this patient with my supervising physician, Dr. Phill Keller. CHIEF COMPLAINT       Chief Complaint   Patient presents with    Chest Pain     Mid sternal chest pain onset 2 days ago. Pt denies n/v.  Pt denies diaphoresis. HISTORY OF PRESENT ILLNESS: 1 or more Elements     History from : Patient    Limitations to history : None    Miguelina Madrigal is a 59 y.o. nontoxic, well-appearing female who presents to the emergency department with a 2-day history of intermittent, pleuritic, exertional, substernal chest pain that radiates into her left ribs. Accompanying symptoms include lightheadedness and shortness of breath. Denies ripping or tearing sensation, nausea, vomiting, dizziness, presyncope, fever, chills, sweats, cough, change in ability to smell/taste, hemoptysis, leg/calf pain or swelling, body aches, abdominal pain, diarrhea, urinary symptoms/retention, other symptoms/concerns. She denies recent travel. She does not have a cardiologist and has not had a cardiac work-up via nuclear medicine stress or echocardiogram.    Nursing Notes were all reviewed and agreed with or any disagreements were addressed in the HPI. REVIEW OF SYSTEMS :      Review of Systems   Constitutional:  Negative for chills, diaphoresis, fatigue and fever. HENT:  Negative for congestion and sore throat. Eyes:  Negative for pain and visual disturbance. Respiratory:  Positive for shortness of breath. Negative for cough. Cardiovascular:  Positive for chest pain. Negative for leg swelling. Gastrointestinal:  Negative for abdominal pain, anal bleeding, nausea and vomiting.    Genitourinary:  Negative for difficulty urinating, dysuria, frequency and urgency. Musculoskeletal:  Negative for back pain and neck pain. Skin:  Negative for rash and wound. Neurological:  Positive for light-headedness. Negative for dizziness. Hematological: Negative. Psychiatric/Behavioral: Negative. Positives and Pertinent negatives as per HPI. SURGICAL HISTORY     Past Surgical History:   Procedure Laterality Date    BREAST SURGERY  2003    left     COLONOSCOPY N/A 3/19/2019    COLONOSCOPY POLYPECTOMY SNARE/COLD BIOPSY performed by Jean Santo MD at [facility] N/A 3/19/2019    COLONOSCOPY WITH BIOPSY performed by Jean Santo MD at [facility]  5/5/2013    ENDOMETRIAL BIOPSY      HEMORROIDECTOMY N/A 4/18/2019    COLORECTAL EXAM UNDER ANESTHESIA WITH RECTAL POLYPECTOMY performed by Romulo Grace MD at [address] [place] [address]       Previous Medications    IBUPROFEN (ADVIL;MOTRIN) 600 MG TABLET    Take 1 tablet by mouth every 6 hours as needed for Pain    OMEPRAZOLE (PRILOSEC) 20 MG DELAYED RELEASE CAPSULE    Take 1 capsule by mouth daily    SIMVASTATIN (ZOCOR) 40 MG TABLET    Take 1 tablet by mouth nightly    TRIAMTERENE-HYDROCHLOROTHIAZIDE (MAXZIDE-25) 37.5-25 MG PER TABLET    Take 1 tablet by mouth daily       ALLERGIES     Aspirin    FAMILYHISTORY       Family History   Problem Relation Age of Onset    Rheum Arthritis Neg Hx     Osteoarthritis Neg Hx     Asthma Neg Hx     Breast Cancer Neg Hx     Cancer Neg Hx     Diabetes Neg Hx     Heart Failure Neg Hx     High Cholesterol Neg Hx     Hypertension Neg Hx     Migraines Neg Hx     Ovarian Cancer Neg Hx     Rashes/Skin Problems Neg Hx     Seizures Neg Hx     Stroke Neg Hx     Thyroid Disease Neg Hx         SOCIAL HISTORY       Social History     Tobacco Use    Smoking status: Never    Smokeless tobacco: Never   Vaping Use    Vaping Use: Never used   Substance Use Topics    Alcohol use:  No

## 2023-07-26 LAB
ALBUMIN SERPL-MCNC: 3.9 G/DL (ref 3.4–5)
ALBUMIN/GLOB SERPL: 1.2 {RATIO} (ref 1.1–2.2)
ALP SERPL-CCNC: 101 U/L (ref 40–129)
ALT SERPL-CCNC: 9 U/L (ref 10–40)
ANION GAP SERPL CALCULATED.3IONS-SCNC: 10 MMOL/L (ref 3–16)
AST SERPL-CCNC: 14 U/L (ref 15–37)
BASOPHILS # BLD: 0 K/UL (ref 0–0.2)
BASOPHILS NFR BLD: 0.8 %
BILIRUB SERPL-MCNC: 0.3 MG/DL (ref 0–1)
BUN SERPL-MCNC: 12 MG/DL (ref 7–20)
CALCIUM SERPL-MCNC: 10.1 MG/DL (ref 8.3–10.6)
CHLORIDE SERPL-SCNC: 102 MMOL/L (ref 99–110)
CHOLEST SERPL-MCNC: 202 MG/DL (ref 0–199)
CO2 SERPL-SCNC: 27 MMOL/L (ref 21–32)
CREAT SERPL-MCNC: 0.6 MG/DL (ref 0.6–1.2)
DEPRECATED RDW RBC AUTO: 13.7 % (ref 12.4–15.4)
EOSINOPHIL # BLD: 0.1 K/UL (ref 0–0.6)
EOSINOPHIL NFR BLD: 2.4 %
GFR SERPLBLD CREATININE-BSD FMLA CKD-EPI: >60 ML/MIN/{1.73_M2}
GLUCOSE SERPL-MCNC: 104 MG/DL (ref 70–99)
HCT VFR BLD AUTO: 38.5 % (ref 36–48)
HDLC SERPL-MCNC: 48 MG/DL (ref 40–60)
HGB BLD-MCNC: 12.9 G/DL (ref 12–16)
LDLC SERPL CALC-MCNC: 124 MG/DL
LV EF: 60 %
LV EF: 80 %
LVEF MODALITY: NORMAL
LVEF MODALITY: NORMAL
LYMPHOCYTES # BLD: 2.1 K/UL (ref 1–5.1)
LYMPHOCYTES NFR BLD: 35.7 %
MAGNESIUM SERPL-MCNC: 2.1 MG/DL (ref 1.8–2.4)
MCH RBC QN AUTO: 28.1 PG (ref 26–34)
MCHC RBC AUTO-ENTMCNC: 33.4 G/DL (ref 31–36)
MCV RBC AUTO: 84.2 FL (ref 80–100)
MONOCYTES # BLD: 0.5 K/UL (ref 0–1.3)
MONOCYTES NFR BLD: 9.1 %
NEUTROPHILS # BLD: 3.1 K/UL (ref 1.7–7.7)
NEUTROPHILS NFR BLD: 52 %
PLATELET # BLD AUTO: 258 K/UL (ref 135–450)
PMV BLD AUTO: 8.7 FL (ref 5–10.5)
POTASSIUM SERPL-SCNC: 3.5 MMOL/L (ref 3.5–5.1)
PROT SERPL-MCNC: 7.2 G/DL (ref 6.4–8.2)
RBC # BLD AUTO: 4.57 M/UL (ref 4–5.2)
SODIUM SERPL-SCNC: 139 MMOL/L (ref 136–145)
TRIGL SERPL-MCNC: 149 MG/DL (ref 0–150)
TSH SERPL DL<=0.005 MIU/L-ACNC: 1.17 UIU/ML (ref 0.27–4.2)
VLDLC SERPL CALC-MCNC: 30 MG/DL
WBC # BLD AUTO: 5.9 K/UL (ref 4–11)

## 2023-07-26 PROCEDURE — 93306 TTE W/DOPPLER COMPLETE: CPT

## 2023-07-26 PROCEDURE — 84443 ASSAY THYROID STIM HORMONE: CPT

## 2023-07-26 PROCEDURE — 6360000002 HC RX W HCPCS: Performed by: INTERNAL MEDICINE

## 2023-07-26 PROCEDURE — 85025 COMPLETE CBC W/AUTO DIFF WBC: CPT

## 2023-07-26 PROCEDURE — G0378 HOSPITAL OBSERVATION PER HR: HCPCS

## 2023-07-26 PROCEDURE — 3430000000 HC RX DIAGNOSTIC RADIOPHARMACEUTICAL: Performed by: INTERNAL MEDICINE

## 2023-07-26 PROCEDURE — 93017 CV STRESS TEST TRACING ONLY: CPT

## 2023-07-26 PROCEDURE — 36415 COLL VENOUS BLD VENIPUNCTURE: CPT

## 2023-07-26 PROCEDURE — 94760 N-INVAS EAR/PLS OXIMETRY 1: CPT

## 2023-07-26 PROCEDURE — 78452 HT MUSCLE IMAGE SPECT MULT: CPT

## 2023-07-26 PROCEDURE — 6370000000 HC RX 637 (ALT 250 FOR IP): Performed by: INTERNAL MEDICINE

## 2023-07-26 PROCEDURE — 80061 LIPID PANEL: CPT

## 2023-07-26 PROCEDURE — 6370000000 HC RX 637 (ALT 250 FOR IP): Performed by: NURSE PRACTITIONER

## 2023-07-26 PROCEDURE — 83735 ASSAY OF MAGNESIUM: CPT

## 2023-07-26 PROCEDURE — 93970 EXTREMITY STUDY: CPT

## 2023-07-26 PROCEDURE — A9502 TC99M TETROFOSMIN: HCPCS | Performed by: INTERNAL MEDICINE

## 2023-07-26 PROCEDURE — 80053 COMPREHEN METABOLIC PANEL: CPT

## 2023-07-26 PROCEDURE — 96372 THER/PROPH/DIAG INJ SC/IM: CPT

## 2023-07-26 PROCEDURE — 99223 1ST HOSP IP/OBS HIGH 75: CPT | Performed by: INTERNAL MEDICINE

## 2023-07-26 PROCEDURE — 2580000003 HC RX 258: Performed by: INTERNAL MEDICINE

## 2023-07-26 RX ORDER — PANTOPRAZOLE SODIUM 40 MG/1
40 TABLET, DELAYED RELEASE ORAL
Status: DISCONTINUED | OUTPATIENT
Start: 2023-07-27 | End: 2023-07-27 | Stop reason: HOSPADM

## 2023-07-26 RX ORDER — SODIUM CHLORIDE 0.9 % (FLUSH) 0.9 %
5-40 SYRINGE (ML) INJECTION EVERY 12 HOURS SCHEDULED
Status: DISCONTINUED | OUTPATIENT
Start: 2023-07-26 | End: 2023-07-27 | Stop reason: HOSPADM

## 2023-07-26 RX ORDER — SODIUM CHLORIDE 0.9 % (FLUSH) 0.9 %
5-40 SYRINGE (ML) INJECTION PRN
Status: DISCONTINUED | OUTPATIENT
Start: 2023-07-26 | End: 2023-07-27 | Stop reason: HOSPADM

## 2023-07-26 RX ORDER — SODIUM CHLORIDE 9 MG/ML
INJECTION, SOLUTION INTRAVENOUS PRN
Status: DISCONTINUED | OUTPATIENT
Start: 2023-07-26 | End: 2023-07-27 | Stop reason: HOSPADM

## 2023-07-26 RX ADMIN — TRIAMTERENE AND HYDROCHLOROTHIAZIDE 1 TABLET: 37.5; 25 TABLET ORAL at 13:27

## 2023-07-26 RX ADMIN — ENOXAPARIN SODIUM 40 MG: 100 INJECTION SUBCUTANEOUS at 22:00

## 2023-07-26 RX ADMIN — TETROFOSMIN 10 MILLICURIE: 1.38 INJECTION, POWDER, LYOPHILIZED, FOR SOLUTION INTRAVENOUS at 07:35

## 2023-07-26 RX ADMIN — ATORVASTATIN CALCIUM 40 MG: 40 TABLET, FILM COATED ORAL at 22:00

## 2023-07-26 RX ADMIN — SODIUM CHLORIDE, PRESERVATIVE FREE 10 ML: 5 INJECTION INTRAVENOUS at 22:00

## 2023-07-26 RX ADMIN — REGADENOSON 0.4 MG: 0.08 INJECTION, SOLUTION INTRAVENOUS at 08:58

## 2023-07-26 RX ADMIN — TETROFOSMIN 30 MILLICURIE: 1.38 INJECTION, POWDER, LYOPHILIZED, FOR SOLUTION INTRAVENOUS at 09:00

## 2023-07-26 NOTE — PLAN OF CARE
Problem: Discharge Planning  Goal: Discharge to home or other facility with appropriate resources  7/26/2023 1020 by Betty Muñoz RN  Outcome: Progressing  7/26/2023 0521 by Sharad Avalos RN  Outcome: Progressing  Flowsheets (Taken 7/25/2023 2159 by Matthias Alonzo RN)  Discharge to home or other facility with appropriate resources:   Identify barriers to discharge with patient and caregiver   Identify discharge learning needs (meds, wound care, etc)   Refer to discharge planning if patient needs post-hospital services based on physician order or complex needs related to functional status, cognitive ability or social support system   Arrange for needed discharge resources and transportation as appropriate   Arrange for interpreters to assist at discharge as needed     Problem: Pain  Goal: Verbalizes/displays adequate comfort level or baseline comfort level  7/26/2023 1020 by Betty Muñoz RN  Outcome: Progressing  7/26/2023 0521 by Sharad Avalos RN  Outcome: Progressing     Problem: Cardiovascular - Adult  Goal: Maintains optimal cardiac output and hemodynamic stability  7/26/2023 1020 by Betty Muñoz RN  Outcome: Progressing  7/26/2023 0521 by Sharad Avalos RN  Outcome: Progressing  Goal: Absence of cardiac dysrhythmias or at baseline  7/26/2023 1020 by Betty Muñoz RN  Outcome: Progressing  7/26/2023 0521 by Sharad Avalos RN  Outcome: Progressing

## 2023-07-26 NOTE — PLAN OF CARE
Problem: Discharge Planning  Goal: Discharge to home or other facility with appropriate resources  Outcome: Progressing  Flowsheets (Taken 7/25/2023 2159 by Dorothy King RN)  Discharge to home or other facility with appropriate resources:   Identify barriers to discharge with patient and caregiver   Identify discharge learning needs (meds, wound care, etc)   Refer to discharge planning if patient needs post-hospital services based on physician order or complex needs related to functional status, cognitive ability or social support system   Arrange for needed discharge resources and transportation as appropriate   Arrange for interpreters to assist at discharge as needed     Problem: Pain  Goal: Verbalizes/displays adequate comfort level or baseline comfort level  Outcome: Progressing     Problem: Cardiovascular - Adult  Goal: Maintains optimal cardiac output and hemodynamic stability  Outcome: Progressing  Goal: Absence of cardiac dysrhythmias or at baseline  Outcome: Progressing

## 2023-07-26 NOTE — PROGRESS NOTES
Pt going down for an Echo, transport will be here shortly to pick her up. .Electronically signed by Amy Chavez RN on 7/26/2023 at 11:16 AM

## 2023-07-26 NOTE — PROGRESS NOTES
Pt off unit for Stress test from 0815 till 10am.  Electronically signed by Ean Myles RN on 7/26/2023 at 10:20 AM

## 2023-07-26 NOTE — PROGRESS NOTES
V2.0  [facility] Hospitalist Progress Note      Name:  Nima Borden /Age/Sex: 1965  (59 y.o. female)   MRN & CSN:  [id number] & [id number] Encounter Date/Time: 2023 11:49 AM EDT    Location:  [id number] PCP: Shari Lopes MD       Hospital Day: 2    Assessment and Plan:   Nima Borden is a 59 y.o. female  who presents with Chest pain    # Chest pain -patient positive for chest pain with radiation to her back. EKG showed normal sinus rhythm with T wave abnormality. Trops negative. Chest x-ray nonacute. CTA of the chest negative for PE or any other acute findings. The patient was admitted and managed on aspirin and her statin and beta-blocker was resumed. Patient denies any further chest pain. Cardiology consulted and the patient underwent stress testing with findings noted below  Cardiac stress test: Abnormal study. There is a moderate sized, moderate intensity, reversible defect of the inferolateral wall which is consistent with ischemia. Normal LV size and systolic function. Non-diagnostic EKG response due to failure to reach target heart rate. Overall findings represent a intermediate risk study. TTE is pending. We will follow-up cardiology plans    # Essential hypertension -on beta-blocker, BP stable  # Mixed hyperlipidemia on statin  # GERD on PPI  Diet Diet NPO   DVT Prophylaxis [x] Lovenox, []  Heparin, [] SCDs, [] Ambulation,  [] Eliquis, [] Xarelto  [] Coumadin   Code Status Full Code   Disposition From: home  Expected Disposition: home  Estimated Date of Discharge: 1-2 days  Patient requires continued hospitalization due to further cardiac work-up   Surrogate Decision Maker/ POA self     Subjective:     Chief Complaint: Chest Pain (Mid sternal chest pain onset 2 days ago.   Pt denies n/v.  Pt denies diaphoresis. )       Nima Borden is a 59 y.o. female who presents with chest pain         Review of Systems:    Review of Systems    Patient denies recurrence of chest pain or back Date of Study      07/26/2023         Gender              Female   Patient Number     [id number]         Date of Birth       1965   Visit Number       [id number]          Age                 59 year(s)   Accession Number   [id number]         Room Number         4228   Corporate ID       [id number]            NM Technician       Radha Gonzalez, RT   Nurse              Boris Andujar,   [address] [facility]                     RN                 Physician           Stefan Calles MD   Ordering Physician Demetris Goddard MD   The procedure was explained in detail to the patient. Risks,  complications and alternative treatments were reviewed. Written consent  was obtained. Procedure Procedure Type:   Nuclear Stress Test:NM MYOCARDIAL SPECT REST EXERCISE OR RX   Study location: [facility] - Nuclear Medicine   Indications: Chest pain. Hospital Status: Outpatient. Height: 66 inches Weight: 215 pounds  Risk Factors   The patient risk factors include:obesity, physical activity, treated  hypercholesterolemia, treated hypertension and dyslipidemia. Conclusions   Summary  Abnormal study. There is a moderate sized, moderate intensity, reversible  defect of the inferolateral wall which is consistent with ischemia. Normal LV size and systolic function. Non-diagnostic EKG response due to failure to reach target heart rate. Overall findings represent a intermediate risk study. Stress Protocols   Resting ECG  Normal sinus rhythm. Resting HR:67 bpm  Resting BP:149/96 mmHg   Pre-stress physical exam: No chest  discomfort noted. Heart and lung sounds  unremarkable. Adult Plus BP cuff used. HS  Troponin 7/6. O2 sat 99% on room air.  To  proceed with Lexiscan Myoview stress  test.  Stress Protocol:Pharmacologic -

## 2023-07-26 NOTE — PROGRESS NOTES
Consent form for procedure has been signed, placed in Pt's chart. Electronically signed by Niko Estrada RN on 7/26/2023 at 6:40 PM

## 2023-07-27 ENCOUNTER — APPOINTMENT (OUTPATIENT)
Dept: CARDIAC CATH/INVASIVE PROCEDURES | Age: 58
End: 2023-07-27
Payer: MEDICAID

## 2023-07-27 VITALS
OXYGEN SATURATION: 97 % | TEMPERATURE: 98 F | WEIGHT: 215.83 LBS | DIASTOLIC BLOOD PRESSURE: 79 MMHG | RESPIRATION RATE: 16 BRPM | HEART RATE: 59 BPM | BODY MASS INDEX: 34.84 KG/M2 | SYSTOLIC BLOOD PRESSURE: 138 MMHG

## 2023-07-27 LAB
ANION GAP SERPL CALCULATED.3IONS-SCNC: 10 MMOL/L (ref 3–16)
BUN SERPL-MCNC: 15 MG/DL (ref 7–20)
CALCIUM SERPL-MCNC: 10.3 MG/DL (ref 8.3–10.6)
CHLORIDE SERPL-SCNC: 101 MMOL/L (ref 99–110)
CO2 SERPL-SCNC: 27 MMOL/L (ref 21–32)
CREAT SERPL-MCNC: 0.7 MG/DL (ref 0.6–1.2)
DEPRECATED RDW RBC AUTO: 13.8 % (ref 12.4–15.4)
GFR SERPLBLD CREATININE-BSD FMLA CKD-EPI: >60 ML/MIN/{1.73_M2}
GLUCOSE SERPL-MCNC: 110 MG/DL (ref 70–99)
HCT VFR BLD AUTO: 42.2 % (ref 36–48)
HGB BLD-MCNC: 14 G/DL (ref 12–16)
LV EF: 60 %
LVEF MODALITY: NORMAL
MCH RBC QN AUTO: 28.1 PG (ref 26–34)
MCHC RBC AUTO-ENTMCNC: 33.2 G/DL (ref 31–36)
MCV RBC AUTO: 84.7 FL (ref 80–100)
PLATELET # BLD AUTO: 284 K/UL (ref 135–450)
PMV BLD AUTO: 8.8 FL (ref 5–10.5)
POTASSIUM SERPL-SCNC: 4.2 MMOL/L (ref 3.5–5.1)
RBC # BLD AUTO: 4.98 M/UL (ref 4–5.2)
SODIUM SERPL-SCNC: 138 MMOL/L (ref 136–145)
WBC # BLD AUTO: 7.3 K/UL (ref 4–11)

## 2023-07-27 PROCEDURE — 2500000003 HC RX 250 WO HCPCS

## 2023-07-27 PROCEDURE — C1769 GUIDE WIRE: HCPCS

## 2023-07-27 PROCEDURE — 99152 MOD SED SAME PHYS/QHP 5/>YRS: CPT

## 2023-07-27 PROCEDURE — 94760 N-INVAS EAR/PLS OXIMETRY 1: CPT

## 2023-07-27 PROCEDURE — 6370000000 HC RX 637 (ALT 250 FOR IP): Performed by: NURSE PRACTITIONER

## 2023-07-27 PROCEDURE — 36415 COLL VENOUS BLD VENIPUNCTURE: CPT

## 2023-07-27 PROCEDURE — 6370000000 HC RX 637 (ALT 250 FOR IP): Performed by: INTERNAL MEDICINE

## 2023-07-27 PROCEDURE — 2580000003 HC RX 258

## 2023-07-27 PROCEDURE — 6360000004 HC RX CONTRAST MEDICATION: Performed by: INTERNAL MEDICINE

## 2023-07-27 PROCEDURE — 85027 COMPLETE CBC AUTOMATED: CPT

## 2023-07-27 PROCEDURE — 6370000000 HC RX 637 (ALT 250 FOR IP)

## 2023-07-27 PROCEDURE — 6360000002 HC RX W HCPCS

## 2023-07-27 PROCEDURE — 99153 MOD SED SAME PHYS/QHP EA: CPT

## 2023-07-27 PROCEDURE — 80048 BASIC METABOLIC PNL TOTAL CA: CPT

## 2023-07-27 PROCEDURE — 93458 L HRT ARTERY/VENTRICLE ANGIO: CPT | Performed by: INTERNAL MEDICINE

## 2023-07-27 PROCEDURE — G0378 HOSPITAL OBSERVATION PER HR: HCPCS

## 2023-07-27 PROCEDURE — 93458 L HRT ARTERY/VENTRICLE ANGIO: CPT

## 2023-07-27 PROCEDURE — 2709999900 HC NON-CHARGEABLE SUPPLY

## 2023-07-27 PROCEDURE — C1894 INTRO/SHEATH, NON-LASER: HCPCS

## 2023-07-27 RX ADMIN — IOPAMIDOL 60 ML: 755 INJECTION, SOLUTION INTRAVENOUS at 10:09

## 2023-07-27 RX ADMIN — ACETAMINOPHEN 650 MG: 325 TABLET ORAL at 11:38

## 2023-07-27 RX ADMIN — ASPIRIN 81 MG: 81 TABLET, CHEWABLE ORAL at 12:24

## 2023-07-27 RX ADMIN — TRIAMTERENE AND HYDROCHLOROTHIAZIDE 1 TABLET: 37.5; 25 TABLET ORAL at 12:24

## 2023-07-27 RX ADMIN — PANTOPRAZOLE SODIUM 40 MG: 40 TABLET, DELAYED RELEASE ORAL at 12:24

## 2023-07-27 ASSESSMENT — PAIN SCALES - GENERAL
PAINLEVEL_OUTOF10: 6
PAINLEVEL_OUTOF10: 3
PAINLEVEL_OUTOF10: 4

## 2023-07-27 ASSESSMENT — PAIN DESCRIPTION - LOCATION
LOCATION: HEAD
LOCATION: HEAD

## 2023-07-27 ASSESSMENT — PAIN DESCRIPTION - DESCRIPTORS: DESCRIPTORS: THROBBING

## 2023-07-27 NOTE — PROGRESS NOTES
V2.0  [facility] Hospitalist Progress Note      Name:  Porsha Nichols /Age/Sex: 1965  (59 y.o. female)   MRN & CSN:  [id number] & [id number] Encounter Date/Time: 2023 11:49 AM EDT    Location:  [id number] PCP: Opal Urbano MD       Hospital Day: 3    Assessment and Plan:   Porsha Nichols is a 59 y.o. female  who presents with Chest pain    # Chest pain -patient positive for chest pain with radiation to her back. EKG showed normal sinus rhythm with T wave abnormality. Trops negative. Chest x-ray nonacute. CTA of the chest negative for PE or any other acute findings. The patient was admitted and managed on aspirin and her statin and beta-blocker was resumed. Patient denies any further chest pain. Cardiology consulted and the patient underwent stress testing with findings noted below  Cardiac stress test 2023: Abnormal study. There is a moderate sized, moderate intensity, reversible defect of the inferolateral wall which is consistent with ischemia. Normal LV size and systolic function. Non-diagnostic EKG response due to failure to reach target heart rate. Overall findings represent a intermediate risk study. TTE 2023 showed EF of 60%, no regional wall motion abnormality, trivial MR TR. Status post [facility] today 2023-report is pending we will follow-up  # Essential hypertension -on beta-blocker, BP stable  # Mixed hyperlipidemia on statin  # GERD on PPI  Diet ADULT DIET; Regular; Low Fat/Low Chol/High Fiber/2 gm Na   DVT Prophylaxis [x] Lovenox, []  Heparin, [] SCDs, [] Ambulation,  [] Eliquis, [] Xarelto  [] Coumadin   Code Status Full Code   Disposition From: home  Expected Disposition: home  Estimated Date of Discharge: 1-2 days  Patient requires continued hospitalization due to further cardiac work-up   Surrogate Decision Maker/ POA self     Subjective:     Chief Complaint: Chest Pain (Mid sternal chest pain onset 2 days ago.   Pt denies n/v.  Pt denies diaphoresis. )       Fama Height: 66 inches Weight: 215 pounds  Risk Factors   The patient risk factors include:obesity, physical activity, treated  hypercholesterolemia, treated hypertension and dyslipidemia. Conclusions   Summary  Abnormal study. There is a moderate sized, moderate intensity, reversible  defect of the inferolateral wall which is consistent with ischemia. Normal LV size and systolic function. Non-diagnostic EKG response due to failure to reach target heart rate. Overall findings represent a intermediate risk study. Stress Protocols   Resting ECG  Normal sinus rhythm. Resting HR:67 bpm  Resting BP:149/96 mmHg   Pre-stress physical exam: No chest  discomfort noted. Heart and lung sounds  unremarkable. Adult Plus BP cuff used. HS  Troponin 7/6. O2 sat 99% on room air. To  proceed with Lexiscan Myoview stress  test.  Stress Protocol:Pharmacologic - Lexiscan's  Peak HR:103 bpm                            HR/BP product:37181  Peak BP:165/95 mmHg  Predicted HR: 160 bpm  % of predicted HR: 64  Test duration: 1 min and 40 sec  Reason for termination:Completed   Arrhythmias  None   Symptoms  Lexiscan 0.4 mg IVP given. No chest discomfort noted. O2 sat 100% on  room air at peak injection. Patient had a brief episode of shortness  of breath that resolved in recovery. Complications  Procedure complication was none. Stress Interpretation  Non-diagnostic EKG response due to failure to reach target heart rate. Procedure Medications   - Lexiscan I.V. 0.4 mg.10 sec  Imaging Protocols   - One Day   Rest                          Stress   Isotope:Myoview/Tetrofosmin   Isotope: Myoview/Tetrofosmin  Isotope dose:13.2 mCi         Isotope dose:31.7 mCi  Administration Route:I.V.      Administration Route:I.V.  Date:07/26/2023 07:35         Date:07/26/2023 09:00                                 Technique:      Gated  Perfusion Images Rest and Stress: Segments: 1 -Normal 2 -Fixed 3 -Reversible 4 -Partialy reversible 5 -Scar 6 -Defect

## 2023-07-27 NOTE — PROGRESS NOTES
Peripheral IV removed without complications. Dry dressing applied. Reviewed discharge paperwork with patient and . Answered all questions. Reviewed post-cath instructions. Right radial site clean, dry and intact. Pt got dressed and gathered belongings.  wheeled out with patient to be discharged home.

## 2023-07-27 NOTE — DISCHARGE SUMMARY
Discharge Summary    Name:  Cathryn Vasquez /Age/Sex: 1965  (59 y.o. female)   MRN & CSN:  [id number] & [id number] Admission Date/Time: 2023 12:20 PM   Attending:  Rosemarie Asher MD Discharging Physician: Simona Melton, [address] [place] [address] Course:   Cathryn Vasquez is a 59 y.o.  female  who presents with Chest pain    # Chest pain -patient positive for chest pain with radiation to her back. EKG showed normal sinus rhythm with T wave abnormality. Trops negative. Chest x-ray nonacute. CTA of the chest negative for PE or any other acute findings. The patient was admitted and managed on aspirin and her statin and beta-blocker was resumed. Patient denies any further chest pain. Cardiology consulted and the patient underwent stress testing with findings noted below  Cardiac stress test 2023: Abnormal study. There is a moderate sized, moderate intensity, reversible defect of the inferolateral wall which is consistent with ischemia. Normal LV size and systolic function. Non-diagnostic EKG response due to failure to reach target heart rate. Overall findings represent a intermediate risk study. TTE 2023 showed EF of 60%, no regional wall motion abnormality, trivial MR TR. Status post [facility] today 2023-  Left dominant system   1. The left main coronary artery arising normal fashion from the   left coronary cusp giving rise to the left anterior descending   artery and the left circumflex artery. There is JANEEN 3 flow. 0% STENOSIS   2. The right coronary artery arises from right coronary ostium in normal fashion. The RCA gives rise to RV marginal branch and terminates into a posterior descending artery and posterior lateral branch. There is JANEEN 3 flow. 0% STENOSIS  3. The LAD arises in normal fashion from left coronary giving rise to diagonals and septal branches. There is JANEEN 3 flow. 0% STENOSIS  4.   The circumflex was given rise to several OMs and PDA and had luminal !Common Femoral!Phasic!      !            ! +--------------+------+------+------------+ ! Popliteal     !Phasic!      !            ! +--------------+------+------+------------+ Left Lower Extremities DVT Study Measurements Left 2D Measurements +------------------------+----------+---------------+----------+ ! Location                ! Visualized! Compressibility! Thrombosis! +------------------------+----------+---------------+----------+ ! Sapheno Femoral Junction! Yes       ! Yes            ! None      ! +------------------------+----------+---------------+----------+ ! GSV Thigh               ! Yes       ! Yes            ! None      ! +------------------------+----------+---------------+----------+ ! Common Femoral          !Yes       ! Yes            ! None      ! +------------------------+----------+---------------+----------+ ! Prox Femoral            !Yes       ! Yes            ! None      ! +------------------------+----------+---------------+----------+ ! Mid Femoral             !Yes       ! Yes            ! None      ! +------------------------+----------+---------------+----------+ ! Dist Femoral            !Yes       ! Yes            ! None      ! +------------------------+----------+---------------+----------+ ! Deep Femoral            !Yes       ! Yes            ! None      ! +------------------------+----------+---------------+----------+ ! Popliteal               !Yes       ! Yes            ! None      ! +------------------------+----------+---------------+----------+ ! GSV Below Knee          ! Yes       ! Yes            ! None      ! +------------------------+----------+---------------+----------+ ! Gastroc                 ! Yes       ! Yes            ! None      ! +------------------------+----------+---------------+----------+ ! Soleal                  !Yes       ! Yes            ! None      ! +------------------------+----------+---------------+----------+ ! PTV                     ! Yes       ! Yes            ! None      ! Date:07/26/2023 09:00                                 Technique:      Gated  Perfusion Images Rest and Stress: Segments: 1 -Normal 2 -Fixed 3 -Reversible 4 -Partialy reversible 5 -Scar 6 -Defect +-------+---------+--------+----+---------+---------+ ! Segment! Perfusion! Severity! Wall! Artifact ! Artreason! +-------+---------+--------+----+---------+---------+ Rest: Segments: 1 -Normal 2 -Fixed 3 -Reversible 4 -Partialy reversible 5 -Scar 6 -Defect +-------+---------+--------+----+---------+---------+ ! Segment! Perfusion! Severity! Wall! Artifact ! Artreason! +-------+---------+--------+----+---------+---------+ Stress: Segments: 1 -Normal 2 -Fixed 3 -Reversible 4 -Partialy reversible 5 -Scar 6 -Defect +-------+---------+--------+----+---------+---------+ ! Segment! Perfusion! Severity! Wall! Artifact ! Artreason!  +-------+---------+--------+----+---------+---------+ Imaging Results    Summed scores     - Summed stress score: 3     - Summed rest score: 5     - Summed difference score:    2     Stress ejection    Ejection fraction:80 %    EDV :45 ml    ESV :9 ml    Stroke volume :36 ml    LV mass :75 gr  Medical History   Additional Medical History   PMH: SANTIAGO   Signatures   ------------------------------------------------------------------  Electronically signed by MD Deborah CullenInterpreting physician) on 07/26/2023 at 11:18  ------------------------------------------------------------------      .re  Discharge Time of 35 minutes    Electronically signed by ALEXANDRIA Guy CNP on 7/27/2023 at 1:39 PM

## 2023-07-27 NOTE — PROGRESS NOTES
Pt returned to unit from cath lab. Walked over to hospital bed without complications. VSS on room air. Complains of slight headache- tylenol given in cath lab. Dry dressing on right radial site clean, dry and intact. Pt in bed in locked and lowest position. Call light within reach. Family at bedside. No other needs stated at this time.

## 2023-07-27 NOTE — PROCEDURES
[facility]   Procedure Note    CLINICAL HISTORY AND INDICATIONS:       Brandi Pruett is a 59 y.o. female with a history of hypertension. [id number]}. INFORMED CONSENT:      Informed consent was obtained from the patient and the family members. I explained to them risks and benefits of the procedure. I explained to them we will do this from either the common femoral artery access or radial access. I explained to the patient that we will use lidocaine for local anaesthesia and moderate sedation. I explained to them any risk of perforation of the vessel, stroke, heart attack, bleeding, death and myocardial infarction during the procedure. The patient understood the risks and benefits and gave informed consent and would like to go ahead with the procedure. Patient agreed to use dual antiplatelet therapy. PROCEDURES PERFORMED:     Left heart catheterization    PROCEDURE TECHNIQUE:      Radial Access: The patient was approached from the right radial artery using a 5-6  Lithuanian slender sheath. Left coronary angiography was done using a Lexa L3.5 diagnostic catheter. Right coronary angiography was done using a Lexa R4 guide catheter. Left ventriculography was done using a pigtail catheter. COMPLICATIONS:  None. At the end of the procedure a radial band device was used for hemostasis. EBL: 20 cc    Moderate Sedation:    ASA 2  Mallampati 2    An independent trained observer pushed medications at my direction. We monitored the patient's level of consciousness and vital signs/physiologic status throughout the procedure duration (see start and start times above). HEMODYNAMICS:  ;  LVEDP 10. There was no gradient between the left ventricle and aorta. ANGIOGRAM/CORONARY ARTERIOGRAM:         Left dominant system    1.   The left main coronary artery arising normal fashion from the left coronary cusp giving rise to the left anterior descending artery and the left

## 2023-07-27 NOTE — DISCHARGE INSTRUCTIONS
CARDIAC ANGIOGRAM (LEFT HEART CATH) - RADIAL ACCESS    Care of your puncture site:  Remove clear bandage 24 hours after the procedure. May shower in 24 hours  Inspect the site daily and gently clean using soap and water. Dry thoroughly and apply a Band-Aid. Normal Observations:  Soreness or tenderness which may last one week. Possible bruising that could last 2 weeks. Activity:  You may resume driving 24 hours following the procedure. You may resume normal activity in 3 days or after the wound heals. Avoid lifting more than 10 pounds for 3 days with affected arm. Do not make important / legal decisions within 24 hours after procedure. Nutrition:  Regular diet  Drink at least 8 to 10 glasses of decaffeinated, non-alcoholic fluid for the next 24 hours to flush the x-ray dye used for your angiogram out of your body. Call your doctor immediately if your condition worsens, for any other concerns, for a follow-up appointment or if you experience any of the following:  Significant bleeding that does not stop after 10 minutes of applying firm pressure on the puncture site. Increased swelling of the wrist.  Unusual pain, numbness, or tingling of the wrist/arm. Any signs of infection such as: redness, yellow drainage at the site, swelling or pain. IF experiencing any recurrent chest pain, arm or jaw pain, shortness of breath,sweating,nausea & vomiting, lighteheadedness or sudden weak.

## 2023-08-08 NOTE — PROGRESS NOTES
[facility]   Cardiology Office Visit      Date: 8/9/2023  CC:    Chief Complaint   Patient presents with    Follow-Up from Hospital     No cardiac symptoms. I had the privilege of visiting Lissett Thomson in the office. Presents today for follow up :after recent hospitalization for chest pain. Patient presented to ED with intermittent chest pain which started day before presentation. When it occurs, it is sharp, middle and left upper chest, up to an 8/10 in intensity, and with radiation to back left shoulder. It occurs 1-2 times a day for variable lengths of time. She has woken up with this pain, but usually it is exertion that makes it worse, and rest that helps. Patient underwent stress test which was negative for ischemia and echo with preserved EF     Past medical history significant for hypertension, hyperlipidemia, Obesity Class I, and GERD. She presents with her . She reports she has been feeling well. No additional episodes of pain. Patient denies lightheadedness, dizziness, shortness of breath,  palpitations, orthopnea, edema, presyncope or syncope. HPI: Lissett Thomson is a 59 y.o.      Past Medical History:   Diagnosis Date    Abnormal Pap smear     ASCUS (atypical squamous cells of undetermined significance) on gynecologic Papanicolaou smear complicating pregnancy, antepartum 5/2015    Endometrial thickening on ultra sound 10/2015    GERD (gastroesophageal reflux disease)     HIGH CHOLESTEROL     Hypertension     Migraines     Moderate dysplasia of cervix 8/2013        Past Surgical History:   Procedure Laterality Date    BREAST SURGERY  2003    left     COLONOSCOPY N/A 3/19/2019    COLONOSCOPY POLYPECTOMY SNARE/COLD BIOPSY performed by Isabel Abrams MD at [facility] N/A 3/19/2019    COLONOSCOPY WITH BIOPSY performed by Isabel Abrams MD at [facility]  5/5/2013    [address] N/A 4/18/2019    COLORECTAL

## 2023-08-09 ENCOUNTER — OFFICE VISIT (OUTPATIENT)
Dept: CARDIOLOGY CLINIC | Age: 58
End: 2023-08-09
Payer: MEDICAID

## 2023-08-09 VITALS
DIASTOLIC BLOOD PRESSURE: 68 MMHG | BODY MASS INDEX: 33.91 KG/M2 | HEIGHT: 66 IN | SYSTOLIC BLOOD PRESSURE: 110 MMHG | HEART RATE: 58 BPM | WEIGHT: 211 LBS | OXYGEN SATURATION: 98 %

## 2023-08-09 DIAGNOSIS — I10 PRIMARY HYPERTENSION: ICD-10-CM

## 2023-08-09 DIAGNOSIS — E78.5 DYSLIPIDEMIA: ICD-10-CM

## 2023-08-09 DIAGNOSIS — R07.9 CHEST PAIN, UNSPECIFIED TYPE: Primary | ICD-10-CM

## 2023-08-09 PROCEDURE — 3074F SYST BP LT 130 MM HG: CPT | Performed by: NURSE PRACTITIONER

## 2023-08-09 PROCEDURE — G8417 CALC BMI ABV UP PARAM F/U: HCPCS | Performed by: NURSE PRACTITIONER

## 2023-08-09 PROCEDURE — 3078F DIAST BP <80 MM HG: CPT | Performed by: NURSE PRACTITIONER

## 2023-08-09 PROCEDURE — 99213 OFFICE O/P EST LOW 20 MIN: CPT | Performed by: NURSE PRACTITIONER

## 2023-08-09 PROCEDURE — 93000 ELECTROCARDIOGRAM COMPLETE: CPT | Performed by: NURSE PRACTITIONER

## 2023-08-09 PROCEDURE — G8427 DOCREV CUR MEDS BY ELIG CLIN: HCPCS | Performed by: NURSE PRACTITIONER

## 2023-08-09 PROCEDURE — 3017F COLORECTAL CA SCREEN DOC REV: CPT | Performed by: NURSE PRACTITIONER

## 2023-08-09 PROCEDURE — 1036F TOBACCO NON-USER: CPT | Performed by: NURSE PRACTITIONER

## 2023-08-11 PROBLEM — I10 PRIMARY HYPERTENSION: Status: ACTIVE | Noted: 2023-08-11

## 2023-08-11 PROBLEM — E78.5 DYSLIPIDEMIA: Status: ACTIVE | Noted: 2023-08-11

## 2023-08-11 ASSESSMENT — ENCOUNTER SYMPTOMS
SHORTNESS OF BREATH: 0
WHEEZING: 0
COUGH: 0
CHEST TIGHTNESS: 0
APNEA: 0

## 2024-01-24 NOTE — PROGRESS NOTES
[facility] PRE-OPERATIVE INSTRUCTIONS    Day of Procedure:     2/6/24           Arrival time:   9:30             Surgery time: 11:00    Take the following medications with a sip of water:  Follow your MD/Surgeons pre-procedure instructions regarding your medications     Do not eat or drink anything after 12:00 midnight prior to your surgery.  This includes water chewing gum, mints and ice chips.   You may brush your teeth and gargle the morning of your surgery, but do not swallow the water     Please see your family doctor/pediatrician for a history and physical and/or concerning medications.   Bring any test results/reports from your physicians office.   If you are under the care of a heart doctor or specialist doctor, please be aware that you may be asked to them for clearance    You may be asked to stop blood thinners such as Coumadin, Plavix, Fragmin, Lovenox, etc., or any anti-inflammatories such as:  Aspirin, Ibuprofen, Advil, Naproxen prior to your surgery.    We also ask that you stop any OTC medications such as fish oil, vitamin E, glucosamine, garlic, Multivitamins, COQ 10, etc. MAY TAKE TYLENOL    We ask that you do not smoke 24 hours prior to surgery  We ask that you do not  drink any alcoholic beverages 24 hours prior to surgery     You must make arrangements for a responsible adult to take you home after your surgery.    For your safety you will not be allowed to leave alone or drive yourself home.  Your surgery will be cancelled if you do not have a ride home.     Also for your safety, it is strongly suggested that someone stay with you the first 24 hours after your surgery.     A parent or legal guardian must accompany a child scheduled for surgery and plan to stay at the hospital until the child is discharged.    Please do not bring other children with you.    For your comfort, please wear simple loose fitting clothing to the hospital.  Please do not bring valuables.    Do not wear any

## 2024-01-24 NOTE — PROGRESS NOTES
[facility] Pre-Admission Testing Electronic Communication Worksheet for OR/ENDO Procedures        Patient: Miguelina Madrigal    DOS: 2/6/24    Arrival Time: 9:30AM    Surgery Time: 11AM    Meds to Bed:  [] YES    [x]  NO    Transportation Confirmed: [x] YES    []  NO  DELL    History and Physical:  [] YES    []  NO  [x] N/A  If yes, please list doctor or Urgent Care and date of H&P: MELODIE MAHER    Additional Clearance(Cardiac, Pulmonary, etc):  [] YES    [x]  NO    Pre-Admission Testing Visit:  [] YES    [x]  NO If no, do labs/testing need to be done DOS?  [] YES    [x]  NO    Medication Reconciliation Complete:  [x] YES    []  NO        Additional Notes:      ROUTINE SCREENING    PATIENT  NEEDS A VIDEO [employer]   SPEAKS FLUENT ENGLISH          Interview Complete: [x] YES    []  NO          Alessandra Fernandez, RN  3:32 PM

## 2024-02-05 ENCOUNTER — ANESTHESIA EVENT (OUTPATIENT)
Dept: ENDOSCOPY | Age: 59
End: 2024-02-05
Payer: MEDICAID

## 2024-02-06 ENCOUNTER — ANESTHESIA (OUTPATIENT)
Dept: ENDOSCOPY | Age: 59
End: 2024-02-06
Payer: MEDICAID

## 2024-02-06 ENCOUNTER — HOSPITAL ENCOUNTER (OUTPATIENT)
Age: 59
Setting detail: OUTPATIENT SURGERY
Discharge: HOME | End: 2024-02-06
Attending: INTERNAL MEDICINE | Admitting: INTERNAL MEDICINE
Payer: MEDICAID

## 2024-02-06 VITALS
SYSTOLIC BLOOD PRESSURE: 134 MMHG | OXYGEN SATURATION: 97 % | TEMPERATURE: 96.9 F | HEART RATE: 56 BPM | WEIGHT: 200 LBS | RESPIRATION RATE: 16 BRPM | DIASTOLIC BLOOD PRESSURE: 70 MMHG | HEIGHT: 66 IN | BODY MASS INDEX: 32.14 KG/M2

## 2024-02-06 PROCEDURE — 2500000003 HC RX 250 WO HCPCS

## 2024-02-06 PROCEDURE — 7100000011 HC PHASE II RECOVERY - ADDTL 15 MIN: Performed by: INTERNAL MEDICINE

## 2024-02-06 PROCEDURE — 3609027000 HC COLONOSCOPY: Performed by: INTERNAL MEDICINE

## 2024-02-06 PROCEDURE — 3700000001 HC ADD 15 MINUTES (ANESTHESIA): Performed by: INTERNAL MEDICINE

## 2024-02-06 PROCEDURE — 7100000000 HC PACU RECOVERY - FIRST 15 MIN: Performed by: INTERNAL MEDICINE

## 2024-02-06 PROCEDURE — 6360000002 HC RX W HCPCS

## 2024-02-06 PROCEDURE — 2709999900 HC NON-CHARGEABLE SUPPLY: Performed by: INTERNAL MEDICINE

## 2024-02-06 PROCEDURE — 2580000003 HC RX 258: Performed by: ANESTHESIOLOGY

## 2024-02-06 PROCEDURE — 3700000000 HC ANESTHESIA ATTENDED CARE: Performed by: INTERNAL MEDICINE

## 2024-02-06 PROCEDURE — 7100000010 HC PHASE II RECOVERY - FIRST 15 MIN: Performed by: INTERNAL MEDICINE

## 2024-02-06 PROCEDURE — 7100000001 HC PACU RECOVERY - ADDTL 15 MIN: Performed by: INTERNAL MEDICINE

## 2024-02-06 RX ORDER — SODIUM CHLORIDE 9 MG/ML
INJECTION, SOLUTION INTRAVENOUS PRN
Status: DISCONTINUED | OUTPATIENT
Start: 2024-02-06 | End: 2024-02-06 | Stop reason: HOSPADM

## 2024-02-06 RX ORDER — SODIUM CHLORIDE 0.9 % (FLUSH) 0.9 %
5-40 SYRINGE (ML) INJECTION EVERY 12 HOURS SCHEDULED
Status: DISCONTINUED | OUTPATIENT
Start: 2024-02-06 | End: 2024-02-06 | Stop reason: HOSPADM

## 2024-02-06 RX ORDER — PROPOFOL 10 MG/ML
INJECTION, EMULSION INTRAVENOUS PRN
Status: DISCONTINUED | OUTPATIENT
Start: 2024-02-06 | End: 2024-02-06 | Stop reason: SDUPTHER

## 2024-02-06 RX ORDER — SODIUM CHLORIDE 0.9 % (FLUSH) 0.9 %
5-40 SYRINGE (ML) INJECTION PRN
Status: DISCONTINUED | OUTPATIENT
Start: 2024-02-06 | End: 2024-02-06 | Stop reason: HOSPADM

## 2024-02-06 RX ORDER — PROPOFOL 10 MG/ML
INJECTION, EMULSION INTRAVENOUS CONTINUOUS PRN
Status: DISCONTINUED | OUTPATIENT
Start: 2024-02-06 | End: 2024-02-06 | Stop reason: SDUPTHER

## 2024-02-06 RX ORDER — LIDOCAINE HYDROCHLORIDE 20 MG/ML
INJECTION, SOLUTION EPIDURAL; INFILTRATION; INTRACAUDAL; PERINEURAL PRN
Status: DISCONTINUED | OUTPATIENT
Start: 2024-02-06 | End: 2024-02-06 | Stop reason: SDUPTHER

## 2024-02-06 RX ADMIN — PROPOFOL 50 MG: 10 INJECTION, EMULSION INTRAVENOUS at 10:25

## 2024-02-06 RX ADMIN — LIDOCAINE HYDROCHLORIDE 100 MG: 20 INJECTION, SOLUTION EPIDURAL; INFILTRATION; INTRACAUDAL; PERINEURAL at 10:25

## 2024-02-06 RX ADMIN — PROPOFOL 180 MCG/KG/MIN: 10 INJECTION, EMULSION INTRAVENOUS at 10:25

## 2024-02-06 RX ADMIN — SODIUM CHLORIDE: 9 INJECTION, SOLUTION INTRAVENOUS at 10:22

## 2024-02-06 ASSESSMENT — PAIN - FUNCTIONAL ASSESSMENT
PAIN_FUNCTIONAL_ASSESSMENT: 0-10
PAIN_FUNCTIONAL_ASSESSMENT: 0-10

## 2024-02-06 NOTE — PROGRESS NOTES
Pt awake on arrival to phase II. Denies pain at present. VSS. Pt wants to remain on stretcher for a little bit. Given coffee and snack. Will call for . Call light within reach.

## 2024-02-06 NOTE — OP NOTE
Colonoscopy Procedure Note      Patient: Miguelina Madrigal  : 1965  Acct#:     Procedure: Colonoscopy with terminal ileal intubation    Date:  2024    Surgeon:  Refugio Arriola MD    Referring Physician:  Jillian Livingston MD    Preoperative Diagnosis:  59 y.o. female  who presents for colonoscopy due to surveillance with history of colon polyps.     Postoperative Diagnosis:    Internal hemorrhoids with scarring at the anorectal verge from prior hemorrhoidectomy    Consent:  The patient or their legal guardian has signed a consent, and is aware of the potential risks, benefits, alternatives, and potential complications of this procedure.  These include, but are not limited to hemorrhage, bleeding, post procedural pain, perforation, phlebitis, aspiration, hypotension, hypoxia, cardiovascular events such as arryhthmia, and possibly death.  Additionally, the possibility of missed colonic polyps and interval colon cancer was discussed in the consent.    Anesthesia:  MAC    Procedure:   An informed consent was obtained from the patient after explanation of indications, benefits, possible risks and complications of the procedure.  The patient was then taken to the endoscopy suite, placed in the left lateral decubitus position, and the above IV anesthesia was administered.    A digital rectal examination was performed and revealed negative without mass, lesions or tenderness.      The Olympus video colonoscope was placed in the patient's rectum under digital direction and advanced to the cecum with ease. The cecum was identified by characteristic anatomy and ballottment. The ileocecal valve was identified.     The preparation was good.    The terminal ileum was normal.    The scope was then withdrawn back through the cecum, ascending, transverse, descending, sigmoid colon, and rectum.  Careful circumferential examination of the mucosa in these areas demonstrated normal colonic mucosa throughout without polyps

## 2024-02-06 NOTE — ANESTHESIA PRE PROCEDURE
hypothyroidism               Abdominal:             Vascular:     - DVT and PE.      Other Findings:             Anesthesia Plan      MAC     ASA 2       Induction: intravenous.      Anesthetic plan and risks discussed with patient and spouse.      Plan discussed with CRNA.    Attending anesthesiologist reviewed and agrees with Preprocedure content        DOS STAFF ADDENDUM:    Pt seen and examined, chart reviewed (including anesthesia, drug and allergy history).  No interval changes to history and physical examination.  Anesthetic plan, risks, benefits, alternatives, and personnel involved discussed with patient.  Patient verbalized an understanding and agrees to proceed.      Megan Nieto MD  February 6, 2024  10:02 AM            Megan Nieto MD   2/6/2024

## 2024-02-06 NOTE — H&P
Pre-operative History and Physical    Patient: Miguelina Madrigal  : 1965  Acct#:     Intended Procedure:  colonoscopy    HISTORY OF PRESENT ILLNESS:  The patient is a 59 y.o. female  who presents for colonoscopy due to surveillance with history of colon polyps.    Past Medical History:        Diagnosis Date    Abnormal Pap smear     ASCUS (atypical squamous cells of undetermined significance) on gynecologic Papanicolaou smear complicating pregnancy, antepartum 2015    Endometrial thickening on ultra sound 10/2015    GERD (gastroesophageal reflux disease)     HIGH CHOLESTEROL     Hypertension     Migraines     Moderate dysplasia of cervix 2013     Past Surgical History:        Procedure Laterality Date    BREAST SURGERY      left     COLONOSCOPY N/A 2019    COLONOSCOPY POLYPECTOMY SNARE/COLD BIOPSY performed by Refugio Arriola MD at [facility] ENDOSCOPY    COLONOSCOPY N/A 2019    COLONOSCOPY WITH BIOPSY performed by Refugio Arriola MD at [facility] ENDOSCOPY    COLPOSCOPY  2013    ENDOMETRIAL BIOPSY      HEMORROIDECTOMY N/A 2019    COLORECTAL EXAM UNDER ANESTHESIA WITH RECTAL POLYPECTOMY performed by Pierre Laboy MD at [facility] OR    WISDOM TOOTH EXTRACTION       Medications Prior to Admission:   No current facility-administered medications on file prior to encounter.     Current Outpatient Medications on File Prior to Encounter   Medication Sig Dispense Refill    simvastatin (ZOCOR) 40 MG tablet Take 1 tablet by mouth nightly      triamterene-hydroCHLOROthiazide (MAXZIDE-25) 37.5-25 MG per tablet Take 1 tablet by mouth daily      omeprazole (PRILOSEC) 20 MG delayed release capsule Take 1 capsule by mouth daily      ibuprofen (ADVIL;MOTRIN) 600 MG tablet Take 1 tablet by mouth every 6 hours as needed for Pain          Allergies:  Aspirin    Social History:   TOBACCO:   reports that she has never smoked. She has never used smokeless tobacco.  ETOH:   reports no history of alcohol

## 2024-02-06 NOTE — PROGRESS NOTES
Pt tolerates PO. Denies pain. VSS. Discharge instructions reviewed with pt and . Both express an understanding of instructions. Up to chair. Pt dressing with 's present. Wheeled pt to 's car for discharge.

## 2024-02-06 NOTE — ANESTHESIA POSTPROCEDURE EVALUATION
Department of Anesthesiology  Postprocedure Note    Patient: Miguelina Madrigal  MRN: [id number]  YOB: 1965  Date of evaluation: 2/6/2024    Procedure Summary       Date: 02/06/24 Room / Location: [place] [address] / [facility]    Anesthesia Start: 1022 Anesthesia Stop: 1044    Procedure: COLONOSCOPY Diagnosis:       Personal history of colonic polyps      (Personal history of colonic polyps [Z86.010])    Surgeons: Refugio Arriola MD Responsible Provider: Megan Nieto MD    Anesthesia Type: MAC ASA Status: 2            Anesthesia Type: No value filed.    Shaina Phase I: Shaina Score: 9    Shaina Phase II: Shaina Score: 10    Anesthesia Post Evaluation    Patient location during evaluation: bedside  Patient participation: complete - patient participated  Level of consciousness: awake and alert  Pain score: 0  Airway patency: patent  Nausea & Vomiting: no vomiting  Cardiovascular status: blood pressure returned to baseline  Respiratory status: acceptable  Hydration status: euvolemic  Pain management: adequate    No notable events documented.

## 2024-02-06 NOTE — DISCHARGE INSTRUCTIONS
Discharge Instructions for Colonoscopy     Recommendations:    - Resume regular medications.  - Resume diet as tolerated.  - Repeat colonoscopy in 10 years.     Colonoscopy is a visual exam of the lining of the large intestine, also called the bowel or colon, with a colonoscope. A colonoscope is a flexible tube with a light and a viewing device. It allows the doctor to view the inside of the colon through a tiny video camera.     Colonoscopy is performed for many reasons: unexplained anemia , pain, diarrhea , bloody stools, cancer screening, among many other reasons.     Complications from a colonoscopy are rare. Some possible serious complications include perforated bowel (which might require surgery) and bleeding (which could require blood transfusion ). Minor complications include bloating, gas, and cramping that can last for 1-2 days after the procedure.     Because air is put into your colon during the procedure, it is normal to pass large amounts of air from your rectum. You may not have a bowel movement for 1-3 days after the procedure.     What You Will Need:  Someone to drive you home after the procedure     Steps to Take:  Home Care -  Rest when you get home.   Because the sedative will make you drowsy, don't drive, operate machinery, or make important decisions the day of the procedure.  Feelings of bloating, gas, or cramping may persist for 24 hours.   Diet -  Try sips of water first. If tolerated, resume bland food (scrambled eggs, toast, soup) first.  If tolerated, resume regular diet or the diet recommended by your physician.   Do not drink alcohol for 24 hours.   Physical Activity -  Ask your doctor when you will be able to return to work.   Do not drive, operate heavy machinery, or do activities that require coordination or balance for 24 hours.   Otherwise, return to your normal routine as soon as you are comfortable to do so, which is usually the next day after the procedure.   Medications - When

## 2024-08-16 ENCOUNTER — HOSPITAL ENCOUNTER (OUTPATIENT)
Dept: WOMENS IMAGING | Age: 59
Discharge: HOME | End: 2024-08-16
Payer: MEDICAID

## 2024-08-16 VITALS — WEIGHT: 200 LBS | HEIGHT: 66 IN | BODY MASS INDEX: 32.14 KG/M2

## 2024-08-16 DIAGNOSIS — Z12.31 VISIT FOR SCREENING MAMMOGRAM: ICD-10-CM

## 2024-08-16 PROCEDURE — 77063 BREAST TOMOSYNTHESIS BI: CPT

## 2025-02-13 NOTE — PLAN OF CARE
No interaction between patient's medications and recommended medication by Pain management specialist  Problem: Discharge Planning  Goal: Discharge to home or other facility with appropriate resources  Outcome: Completed     Problem: Pain  Goal: Verbalizes/displays adequate comfort level or baseline comfort level  Outcome: Completed     Problem: Cardiovascular - Adult  Goal: Maintains optimal cardiac output and hemodynamic stability  Outcome: Completed  Goal: Absence of cardiac dysrhythmias or at baseline  Outcome: Completed

## 2025-08-20 ENCOUNTER — HOSPITAL ENCOUNTER (OUTPATIENT)
Dept: WOMENS IMAGING | Age: 60
Discharge: HOME | End: 2025-08-20
Payer: MEDICAID

## 2025-08-20 VITALS — HEIGHT: 66 IN | BODY MASS INDEX: 30.53 KG/M2 | WEIGHT: 190 LBS

## 2025-08-20 DIAGNOSIS — Z12.31 VISIT FOR SCREENING MAMMOGRAM: ICD-10-CM

## 2025-08-20 PROCEDURE — 77063 BREAST TOMOSYNTHESIS BI: CPT

## (undated) DEVICE — GOWN,AURORA,NONREINF,RAGLAN,XXL,STERILE: Brand: MEDLINE

## (undated) DEVICE — SURGIFOAM SPNG SZ 100

## (undated) DEVICE — SNARE ENDOSCP DIA9MM SHTH DIA2.4MM CLD FOR POLYP EXACTO

## (undated) DEVICE — ENDOSCOPY KIT: Brand: MEDLINE INDUSTRIES, INC.

## (undated) DEVICE — FORCEPS BX 240CM 2.4MM L NDL RAD JAW 4 M00513334

## (undated) DEVICE — CONTAINER SPEC 480ML CLR POLYSTYR 10% NEUT BUFF FRMLN ZN

## (undated) DEVICE — COVER LT HNDL BLU PLAS

## (undated) DEVICE — Z DISCONTINUED BY MEDLINE USE 2711682 TRAY SKIN PREP DRY W/ PREM GLV

## (undated) DEVICE — MINOR SET UP PK

## (undated) DEVICE — KIT OR ROOM TURNOVER W/STRAP

## (undated) DEVICE — GLOVE SURG SZ 75 L12IN FNGR THK87MIL WHT LTX FREE

## (undated) DEVICE — SOLUTION IV IRRIG POUR BRL 0.9% SODIUM CHL 2F7124

## (undated) DEVICE — UNDERPAD INCONT XL MESH PROTCT + DISP FOR MAT USE

## (undated) DEVICE — SOLUTION PREP POVIDONE IOD FOR SKIN MUCOUS MEM PRIOR TO

## (undated) DEVICE — ELECTROSURGICAL PENCIL BUTTON SWITCH NON COATED BLADE ELECTRODE 10 FT (3 M) CORD HOLSTER: Brand: MEGADYNE

## (undated) DEVICE — SPONGE GZ W4XL4IN COT 12 PLY TYP VII WVN C FLD DSGN

## (undated) DEVICE — GLOVE SURG SZ 8 L12IN FNGR THK79MIL GRN LTX FREE

## (undated) DEVICE — PAD,ABDOMINAL,8"X7.5",STERILE,LF,1/PK: Brand: MEDLINE

## (undated) DEVICE — NEEDLE HYPO 25GA L1.5IN BVL ORIENTED ECLIPSE

## (undated) DEVICE — SEALER LAP SM L18.8CM OPN JAW HAND/FOOT SWCH FORCETRIAD

## (undated) DEVICE — ELECTRODE PT RET AD L9FT HI MOIST COND ADH HYDRGEL CORDED

## (undated) DEVICE — TUBING, SUCTION, 1/4" X 12', STRAIGHT: Brand: MEDLINE

## (undated) DEVICE — SHEET, T, LAPAROTOMY, STERILE: Brand: MEDLINE

## (undated) DEVICE — CANISTER, RIGID, 1200CC: Brand: MEDLINE INDUSTRIES, INC.